# Patient Record
Sex: FEMALE | Race: WHITE | URBAN - METROPOLITAN AREA
[De-identification: names, ages, dates, MRNs, and addresses within clinical notes are randomized per-mention and may not be internally consistent; named-entity substitution may affect disease eponyms.]

---

## 2023-09-15 PROBLEM — F32.A DEPRESSION: Status: ACTIVE | Noted: 2023-09-15

## 2023-09-15 PROBLEM — E03.9 ACQUIRED HYPOTHYROIDISM: Status: ACTIVE | Noted: 2019-09-05

## 2023-09-15 PROBLEM — F43.23 ADJUSTMENT REACTION WITH ANXIETY AND DEPRESSION: Status: ACTIVE | Noted: 2021-03-18

## 2023-09-15 PROBLEM — E55.9 VITAMIN D DEFICIENCY: Status: ACTIVE | Noted: 2020-09-04

## 2023-09-15 PROBLEM — E88.810 INSULIN RESISTANCE SYNDROME: Status: ACTIVE | Noted: 2021-03-18

## 2023-09-15 PROBLEM — E87.5 HYPERPOTASSEMIA: Status: ACTIVE | Noted: 2020-10-16

## 2023-09-15 PROBLEM — R76.0 ABNORMAL ANTINUCLEAR ANTIBODY TITER: Status: ACTIVE | Noted: 2023-09-15

## 2023-09-15 PROBLEM — E11.9 CONTROLLED DIABETES MELLITUS TYPE II WITHOUT COMPLICATION (MULTI): Status: ACTIVE | Noted: 2021-03-19

## 2023-09-15 PROBLEM — M19.90 ARTHRITIS: Status: ACTIVE | Noted: 2023-09-15

## 2023-09-15 PROBLEM — F41.9 ANXIETY: Status: ACTIVE | Noted: 2019-09-05

## 2023-09-15 PROBLEM — N95.9 MENOPAUSAL DISORDER: Status: ACTIVE | Noted: 2020-09-04

## 2023-09-15 PROBLEM — E78.2 MIXED HYPERLIPIDEMIA: Status: ACTIVE | Noted: 2019-09-05

## 2023-09-15 PROBLEM — K21.9 GASTROESOPHAGEAL REFLUX DISEASE: Status: ACTIVE | Noted: 2019-09-05

## 2023-09-15 PROBLEM — E53.8 COBALAMIN DEFICIENCY: Status: ACTIVE | Noted: 2020-09-04

## 2023-09-15 PROBLEM — N95.1 HOT FLASHES DUE TO MENOPAUSE: Status: ACTIVE | Noted: 2022-10-11

## 2023-09-15 RX ORDER — LANOLIN ALCOHOL/MO/W.PET/CERES
1 CREAM (GRAM) TOPICAL DAILY
COMMUNITY
End: 2023-10-24 | Stop reason: WASHOUT

## 2023-09-15 RX ORDER — ROSUVASTATIN CALCIUM 5 MG/1
1 TABLET, COATED ORAL DAILY
COMMUNITY
Start: 2023-05-24 | End: 2023-10-24 | Stop reason: SDUPTHER

## 2023-09-15 RX ORDER — MAGNESIUM GLUCONATE 27.5 (500)
500 TABLET ORAL DAILY
COMMUNITY
Start: 2019-09-05 | End: 2024-01-26 | Stop reason: ALTCHOICE

## 2023-09-15 RX ORDER — FLUTICASONE PROPIONATE 50 MCG
1 SPRAY, SUSPENSION (ML) NASAL DAILY
COMMUNITY

## 2023-09-15 RX ORDER — AZELASTINE 1 MG/ML
1 SPRAY, METERED NASAL 2 TIMES DAILY
COMMUNITY
End: 2023-10-24 | Stop reason: WASHOUT

## 2023-09-15 RX ORDER — ASPIRIN 81 MG/1
1 TABLET ORAL DAILY
COMMUNITY
Start: 2018-08-06 | End: 2024-03-18 | Stop reason: ALTCHOICE

## 2023-09-15 RX ORDER — VENLAFAXINE HYDROCHLORIDE 150 MG/1
150 CAPSULE, EXTENDED RELEASE ORAL DAILY
COMMUNITY
Start: 2023-03-21 | End: 2023-10-24 | Stop reason: SDUPTHER

## 2023-09-15 RX ORDER — IPRATROPIUM BROMIDE 42 UG/1
2 SPRAY, METERED NASAL 2 TIMES DAILY
COMMUNITY
Start: 2020-09-04

## 2023-09-15 RX ORDER — MAGNESIUM OXIDE/MAG AA CHELATE 300 MG
1 CAPSULE ORAL DAILY PRN
COMMUNITY

## 2023-09-15 RX ORDER — FLUTICASONE PROPIONATE 50 MCG
2 SPRAY, SUSPENSION (ML) NASAL DAILY
COMMUNITY
Start: 2018-08-06 | End: 2023-10-24 | Stop reason: WASHOUT

## 2023-09-15 RX ORDER — MELOXICAM 15 MG/1
1 TABLET ORAL DAILY
COMMUNITY
Start: 2020-09-04 | End: 2023-10-24 | Stop reason: SDUPTHER

## 2023-09-15 RX ORDER — HYDROQUINONE 40 MG/G
CREAM TOPICAL
COMMUNITY
Start: 2021-03-18 | End: 2024-03-18 | Stop reason: ALTCHOICE

## 2023-09-15 RX ORDER — VENLAFAXINE HYDROCHLORIDE 75 MG/1
1 CAPSULE, EXTENDED RELEASE ORAL DAILY
COMMUNITY
End: 2023-10-24 | Stop reason: WASHOUT

## 2023-09-15 RX ORDER — TRIAMCINOLONE ACETONIDE 1 MG/G
OINTMENT TOPICAL 3 TIMES DAILY
COMMUNITY
Start: 2018-08-06 | End: 2023-10-24 | Stop reason: WASHOUT

## 2023-09-15 RX ORDER — LEVOTHYROXINE SODIUM 88 UG/1
1 TABLET ORAL DAILY
COMMUNITY
Start: 2018-08-06 | End: 2023-10-24 | Stop reason: SDUPTHER

## 2023-09-15 RX ORDER — CALCIUM CARBONATE 500(1250)
1 TABLET ORAL DAILY
COMMUNITY
Start: 2018-08-06 | End: 2023-10-24 | Stop reason: WASHOUT

## 2023-09-15 RX ORDER — ACETAMINOPHEN 500 MG
1 TABLET ORAL DAILY
COMMUNITY
Start: 2018-08-06

## 2023-09-15 RX ORDER — OMEPRAZOLE 40 MG/1
CAPSULE, DELAYED RELEASE ORAL
COMMUNITY
Start: 2018-08-06 | End: 2023-10-24 | Stop reason: SDUPTHER

## 2023-10-20 ENCOUNTER — APPOINTMENT (OUTPATIENT)
Dept: PRIMARY CARE | Facility: CLINIC | Age: 78
End: 2023-10-20
Payer: MEDICARE

## 2023-10-24 ENCOUNTER — OFFICE VISIT (OUTPATIENT)
Dept: PRIMARY CARE | Facility: CLINIC | Age: 78
End: 2023-10-24
Payer: MEDICARE

## 2023-10-24 VITALS
HEIGHT: 64 IN | SYSTOLIC BLOOD PRESSURE: 124 MMHG | BODY MASS INDEX: 29.1 KG/M2 | OXYGEN SATURATION: 98 % | DIASTOLIC BLOOD PRESSURE: 82 MMHG | HEART RATE: 78 BPM | WEIGHT: 170.47 LBS

## 2023-10-24 DIAGNOSIS — E03.9 ACQUIRED HYPOTHYROIDISM: ICD-10-CM

## 2023-10-24 DIAGNOSIS — M54.50 ACUTE RIGHT-SIDED LOW BACK PAIN WITHOUT SCIATICA: Primary | ICD-10-CM

## 2023-10-24 DIAGNOSIS — Z76.89 ENCOUNTER TO ESTABLISH CARE WITH NEW DOCTOR: ICD-10-CM

## 2023-10-24 DIAGNOSIS — K21.9 GASTROESOPHAGEAL REFLUX DISEASE, UNSPECIFIED WHETHER ESOPHAGITIS PRESENT: ICD-10-CM

## 2023-10-24 DIAGNOSIS — E78.5 HYPERLIPIDEMIA, UNSPECIFIED HYPERLIPIDEMIA TYPE: ICD-10-CM

## 2023-10-24 DIAGNOSIS — N95.1 HOT FLASHES DUE TO MENOPAUSE: ICD-10-CM

## 2023-10-24 PROCEDURE — 99204 OFFICE O/P NEW MOD 45 MIN: CPT | Performed by: STUDENT IN AN ORGANIZED HEALTH CARE EDUCATION/TRAINING PROGRAM

## 2023-10-24 PROCEDURE — 1159F MED LIST DOCD IN RCRD: CPT | Performed by: STUDENT IN AN ORGANIZED HEALTH CARE EDUCATION/TRAINING PROGRAM

## 2023-10-24 PROCEDURE — 99214 OFFICE O/P EST MOD 30 MIN: CPT | Mod: GC | Performed by: STUDENT IN AN ORGANIZED HEALTH CARE EDUCATION/TRAINING PROGRAM

## 2023-10-24 PROCEDURE — 1036F TOBACCO NON-USER: CPT | Performed by: STUDENT IN AN ORGANIZED HEALTH CARE EDUCATION/TRAINING PROGRAM

## 2023-10-24 PROCEDURE — 1125F AMNT PAIN NOTED PAIN PRSNT: CPT | Performed by: STUDENT IN AN ORGANIZED HEALTH CARE EDUCATION/TRAINING PROGRAM

## 2023-10-24 RX ORDER — VENLAFAXINE HYDROCHLORIDE 150 MG/1
150 CAPSULE, EXTENDED RELEASE ORAL DAILY
Qty: 90 CAPSULE | Refills: 1 | Status: SHIPPED | OUTPATIENT
Start: 2023-10-24 | End: 2024-04-15 | Stop reason: SDUPTHER

## 2023-10-24 RX ORDER — MELOXICAM 15 MG/1
15 TABLET ORAL DAILY
Qty: 30 TABLET | Refills: 0 | Status: SHIPPED | OUTPATIENT
Start: 2023-10-24 | End: 2023-11-20

## 2023-10-24 RX ORDER — LEVOTHYROXINE SODIUM 88 UG/1
88 TABLET ORAL DAILY
Qty: 90 TABLET | Refills: 1 | Status: SHIPPED | OUTPATIENT
Start: 2023-10-24 | End: 2024-04-15 | Stop reason: SDUPTHER

## 2023-10-24 RX ORDER — OMEPRAZOLE 40 MG/1
40 CAPSULE, DELAYED RELEASE ORAL
Qty: 90 CAPSULE | Refills: 1 | Status: SHIPPED | OUTPATIENT
Start: 2023-10-24 | End: 2024-04-15 | Stop reason: SDUPTHER

## 2023-10-24 RX ORDER — ROSUVASTATIN CALCIUM 5 MG/1
5 TABLET, COATED ORAL DAILY
Qty: 90 TABLET | Refills: 1 | Status: SHIPPED | OUTPATIENT
Start: 2023-10-24 | End: 2024-04-15 | Stop reason: SDUPTHER

## 2023-10-24 ASSESSMENT — PATIENT HEALTH QUESTIONNAIRE - PHQ9
1. LITTLE INTEREST OR PLEASURE IN DOING THINGS: NOT AT ALL
2. FEELING DOWN, DEPRESSED OR HOPELESS: NOT AT ALL
SUM OF ALL RESPONSES TO PHQ9 QUESTIONS 1 AND 2: 0

## 2023-10-24 ASSESSMENT — COLUMBIA-SUICIDE SEVERITY RATING SCALE - C-SSRS
6. HAVE YOU EVER DONE ANYTHING, STARTED TO DO ANYTHING, OR PREPARED TO DO ANYTHING TO END YOUR LIFE?: NO
2. HAVE YOU ACTUALLY HAD ANY THOUGHTS OF KILLING YOURSELF?: NO
1. IN THE PAST MONTH, HAVE YOU WISHED YOU WERE DEAD OR WISHED YOU COULD GO TO SLEEP AND NOT WAKE UP?: NO

## 2023-10-24 ASSESSMENT — PAIN SCALES - GENERAL: PAINLEVEL: 10-WORST PAIN EVER

## 2023-10-24 NOTE — PROGRESS NOTES
Subjective   Patient ID: PENNY Hhan is a 77 y.o. female who presents for Med Refill (Pt would like a refill on all her meds / nr), Labs Only (Pt would like to get lab work order to check if she is diabetic), and Back Pain (Pt would like to discuss her lower right side pain, for some months ).    HPI  78 yo female here to establish care  Est care  2. Menopausal symptoms  Gets bad hot flashes  On venlafaxine  mg daily  3. Hypothyroidism  On Levothyroxine 188 mcg  4. GERD  On omeprazole 40 mg daily  Mainly has issues at night when sleeping  5. HLD  Rosuvastatin 5 mg  6. R lower back pain  Going on for months  Has it almost every morning  After she starts moving, it improves  No pain down leg  Chiropractor isn't helping  Meloxicam has been helpful in past    Review of Systems  All pertinent positive symptoms are included in the history of present illness.    All other systems have been reviewed and are negative and noncontributory to this patient's current ailments.     Allergies   Allergen Reactions    Acyclovir Unknown    Hydrocodone-Homatropine Itching    Other GI Upset        Immunization History   Administered Date(s) Administered    Flu vaccine, quadrivalent, high-dose, preservative free, age 65y+ (FLUZONE) 10/05/2021, 10/14/2022    Influenza, High Dose Seasonal, Preservative Free 10/30/2017, 10/16/2018, 11/01/2018, 09/13/2019, 09/30/2019    Influenza, Unspecified 10/05/2021    Influenza, seasonal, injectable 09/13/2019, 09/11/2020    Pfizer COVID-19 vaccine, bivalent, age 12 years and older (30 mcg/0.3 mL) 10/14/2022    Pfizer Gray Cap SARS-CoV-2 05/23/2022    Pfizer Purple Cap SARS-CoV-2 03/01/2021, 03/30/2021, 10/05/2021    Pneumococcal conjugate vaccine, 13-valent (PREVNAR 13) 09/06/2015, 09/16/2015    Pneumococcal polysaccharide vaccine, 23-valent, age 2 years and older (PNEUMOVAX 23) 01/04/2012    Pneumococcal, Unspecified 01/04/2012    Tdap vaccine, age 7 year and older (BOOSTRIX) 06/11/2022     "Zoster vaccine, recombinant, adult (SHINGRIX) 12/01/2009, 12/03/2020    Zoster, live 08/07/2020       Objective   Vitals:    10/24/23 1050   BP: 124/82   BP Location: Left arm   Patient Position: Sitting   BP Cuff Size: Adult   Pulse: 78   SpO2: 98%   Weight: 77.3 kg (170 lb 7.5 oz)   Height: 1.626 m (5' 4\")       Physical Exam  CONSTITUTIONAL - well nourished, well developed, looks like stated age, in no acute distress, not ill-appearing, and not tired appearing  SKIN - normal skin color and pigmentation, normal skin turgor without rash, lesions, or nodules visualized  HEAD - no trauma, normocephalic  EYES - extraocular muscles are intact, and normal external exam  ENT - atraumatic  NECK - supple without rigidity, no neck mass was observed  CHEST - clear to auscultation, no wheezing, no crackles and no rales, good effort  CARDIAC - regular rate and regular rhythm, no skipped beats, no murmur  ABDOMEN - no organomegaly, soft, nontender, nondistended, no guarding/rebound/rigidity, negative McBurney sign and negative Ogden sign  EXTREMITIES - no edema, no deformities  MSK - R back: PTP in right lower back, Full ROM, pain with back extension and right side bending. R LE: Full ROM, Full muscle strength  NEUROLOGICAL - normal gait, normal balance, normal motor, no ataxia, alert, oriented and no focal signs  PSYCHIATRIC - alert, pleasant and cordial, age-appropriate  IMMUNOLOGIC - no cervical lymphadenopathy     Assessment/Plan   Problem List Items Addressed This Visit       Hot flashes due to menopause    Gastroesophageal reflux disease    Acquired hypothyroidism     Other Visit Diagnoses       Acute right-sided low back pain without sciatica    -  Primary    Encounter to establish care with new doctor        Hyperlipidemia, unspecified hyperlipidemia type            78 yo female here to establish care  Est care  PMHx reviewed  2. Menopausal symptoms  Continue venlafaxine  mg daily  3. Hypothyroidism  Continue " Levothyroxine 188 mcg  4. GERD  Continue omeprazole 40 mg daily  Mainly has issues at night when sleeping  5. HLD  Continue Rosuvastatin 5 mg  6. R lower back pain  PT referral  Meloxicam prn    RTC in 6 months for HM

## 2023-11-07 ENCOUNTER — APPOINTMENT (OUTPATIENT)
Dept: PRIMARY CARE | Facility: CLINIC | Age: 78
End: 2023-11-07
Payer: MEDICARE

## 2023-11-08 ENCOUNTER — EVALUATION (OUTPATIENT)
Dept: PHYSICAL THERAPY | Facility: CLINIC | Age: 78
End: 2023-11-08
Payer: MEDICARE

## 2023-11-08 DIAGNOSIS — M54.50 ACUTE RIGHT-SIDED LOW BACK PAIN WITHOUT SCIATICA: ICD-10-CM

## 2023-11-08 DIAGNOSIS — M54.50 LUMBAGO: Primary | ICD-10-CM

## 2023-11-08 PROCEDURE — 97162 PT EVAL MOD COMPLEX 30 MIN: CPT | Mod: GP

## 2023-11-08 ASSESSMENT — PAIN - FUNCTIONAL ASSESSMENT: PAIN_FUNCTIONAL_ASSESSMENT: 0-10

## 2023-11-08 NOTE — LETTER
November 8, 2023     Patient: Sagar Hahn   YOB: 1945   Date of Visit: 11/8/2023       To Whom it May Concern:    Sagar Hahn was seen in my clinic on 11/8/2023. She {Return to school/sport:94181}.    If you have any questions or concerns, please don't hesitate to call.         Sincerely,          Juany Soliman, PT        CC: No Recipients

## 2023-11-08 NOTE — LETTER
November 8, 2023     Patient: Sagar Hahn   YOB: 1945   Date of Visit: 11/8/2023       To Whom It May Concern:    It is my medical opinion that Sagar Hahn {Work release (duty restriction):01101}.    If you have any questions or concerns, please don't hesitate to call.         Sincerely,        Juany Soliman, PT    CC: No Recipients

## 2023-11-08 NOTE — PROGRESS NOTES
Physical Therapy Evaluation    Patient Name: PENNY Hahn  MRN: [id number]  Evaluation Date: 11/8/2023  Time Calculation  Start Time: 1100  Stop Time: 1140  Time Calculation (min): 40 min      Subjective   General:  General  Reason for Referral: R LB pain  Past Medical History Relevant to Rehab: Insidous onset of R LBP.  Pt saw chiropractor who did adjustments but no relief.  Pt saw PCP referred pt to PT.  No Xrays were done.    Precautions:   none    Relevant PMH:  Arthritis, Hypothyroidism, acid reflux, R RTC issues per pt    Red flags: none    Pain:  Pain Assessment: 0-10  Pain Score:  (Pain increases to 4/10 in AM but now 0/10)  Pain Type: Chronic pain  Pain Location:  (Pt reports pain is intermittent and achy.  No radicular pain.  On occasion pt has L LE numbness but this has been for years and has not changed with current LBP.)  Effect of Pain on Daily Activities: Pain in LB is worst in morning and eases as she moves or walks around.  Pain only comes on in morning.  Pt is avid exerciser without problems.  Patient's Stated Pain Goal:  (No pain in morning.)  Home Living:  Home Living Comment: Pt lives alone in one floor home with one step to enter.    Prior Function Per Pt/Caregiver Report:  Level of [place]: Independent with ADLs and functional transfers, Independent with homemaking with ambulation  Vocational: Retired  Leisure: line dancer and exercise classes      OBJECTIVE:  Objective   Posture:   Ant head with forward rounded shoulders.  Mild decrease in lumbar lordosis  Range of Motion:   AROM trunk:  flex to 61 deg and ext to 13 deg both without increased pain  B LE AROM is wnl  Strength:   Trunk strength is 3+/5  B Hip strength is 4/5, B knee and ankle DF strength is 4+/5  Flexibility:   Passive SLR to 50 deg on R and 53 deg on L  Palpation:   Tender 1-2 inches proximal to R SI joint  Special Tests:   Negative slump and SLR bilaterally  Gait:   No gross abnormalities    Outcome Measures:  Oswestry      Assessment  PT Assessment Results: Decreased range of motion, Pain  Rehab Prognosis: Good  Evaluation/Treatment Tolerance: Patient tolerated treatment well    Pt is a 77 y.o. female who presents with impairments of R LBP which affects mobility first thing in morning.  Pt has decreased flexibility/ Pt would benefit from skilled physical therapy intervention to improve above impairments and facilitate return to function.    Plan  Treatment/Interventions: Electrical stimulation, Manual therapy, Neuromuscular re-education, Therapeutic activities, Therapeutic exercises, Ultrasound, Taping techniques  PT Plan: Skilled PT  PT Frequency:  (PT to see pt 1-2x/week for up to 8 visits total in 90 days)  Number of Treatments Authorized: awaiting authorization  Rehab Potential: Good  Plan of Care Agreement: Patient    Plan for next visit: Stretching ex for LB for HEP for AM    OP EDUCATION:  Education  Individual(s) Educated: Patient  Education Provided: Anatomy, POC, Posture  Risk and Benefits Discussed with Patient/Caregiver/Other: yes  Patient/Caregiver Demonstrated Understanding: yes  Plan of Care Discussed and Agreed Upon: yes  Patient Response to Education: Patient/Caregiver Verbalized Understanding of Information, Patient/Caregiver Asked Appropriate Questions        Goals:  Active       PT Problem R LBP       PT Goal 1       Start:  11/08/23    Expected End:  02/06/24       Pt will be independent with HEP to allow pt to wake up painfree of LBP in morning         PT Goal 2       Start:  11/08/23    Expected End:  02/06/24       Pt will demonstrate increased flexibility to be painfree with trunk motion in morning         PT Goal 3       Start:  11/08/23    Expected End:  02/06/24       Pt will demonstrate proper body mechanics during PT sessions to help avoid exacerbation of LBP

## 2023-12-05 ENCOUNTER — TREATMENT (OUTPATIENT)
Dept: PHYSICAL THERAPY | Facility: CLINIC | Age: 78
End: 2023-12-05
Payer: MEDICARE

## 2023-12-05 DIAGNOSIS — M54.50 LUMBAGO: ICD-10-CM

## 2023-12-05 DIAGNOSIS — M54.50 ACUTE RIGHT-SIDED LOW BACK PAIN WITHOUT SCIATICA: ICD-10-CM

## 2023-12-05 PROCEDURE — 97110 THERAPEUTIC EXERCISES: CPT | Mod: GP,CQ

## 2023-12-05 PROCEDURE — 97140 MANUAL THERAPY 1/> REGIONS: CPT | Mod: GP,CQ

## 2023-12-05 ASSESSMENT — PAIN - FUNCTIONAL ASSESSMENT: PAIN_FUNCTIONAL_ASSESSMENT: 0-10

## 2023-12-05 ASSESSMENT — PAIN SCALES - GENERAL: PAINLEVEL_OUTOF10: 0 - NO PAIN

## 2023-12-05 NOTE — PROGRESS NOTES
"Physical Therapy Treatment    Patient Name: Sagar Hahn  MRN: [id number]  Encounter date:  12/5/2023  Time Calculation  Start Time: 1045  Stop Time: 1130  Time Calculation (min): 45 min    Visit Number:  1 / 10 + eval   Visit Authorized:  10 visit 11/10/23 to 2/7/24    Current Problem  1. Acute right-sided low back pain without sciatica  Follow Up In Physical Therapy      2. Lumbago  Follow Up In Physical Therapy          Precautions  Precautions  Precautions Comment: None    Pain  Pain Assessment: 0-10  Pain Score: 0 - No pain    Subjective  General  General Comment: Vist 1 of 10 (\"It is just in the morning. I can walk around and it goes away but it is consistant.\")       Objective  Tight ITB RLE in sidelying       Treatment:   Stretching ex for LB for HEP for AM  HEP instructed and handouts issued.  Log roll sit <> supine x 2   Hooklying   LTR x 10   SKTC alt 3 ea hold x 10 ea   Hamsting stretch 10\"x 3   DKTC - one at a time  3 sec hold x 3    Nustep level 1 with UE, 5 min     Standing x 10 BUE support   DL B heel raises with focus on eccentric   B Toe raises  3 way hip R/L     Manual left side lying  Glut STM right, mnaual ITB stretch R  **pt reports right shoulder bothered her during manual** (consider another position next session)       Billed Treatment Times:  Therapeutic Exercise 30 min and Manual Therapy  10 min      Assessment:   Instructed AM stretching program for c/o of right sided pain. Handouts issued. Initiated lumbopelivc ROM and strengthening.   Pt's response to treatment:  Good     Pain end of session:  0    Plan:  OP PT Plan  Treatment/Interventions: Electrical stimulation, Manual therapy, Neuromuscular re-education, Therapeutic activities, Therapeutic exercises, Ultrasound, Taping techniques  PT Plan: Skilled PT  Number of Treatments Authorized: 10 visit approved  Continue with current POC/no changes    Assessment of current progress against goals:  Insufficient treatment time to assess " progress    Goals:  Active       PT Problem R LBP       PT Goal 1       Start:  11/08/23    Expected End:  02/06/24       Pt will be independent with HEP to allow pt to wake up painfree of LBP in morning         PT Goal 2       Start:  11/08/23    Expected End:  02/06/24       Pt will demonstrate increased flexibility to be painfree with trunk motion in morning         PT Goal 3       Start:  11/08/23    Expected End:  02/06/24       Pt will demonstrate proper body mechanics during PT sessions to help avoid exacerbation of LBP

## 2023-12-05 NOTE — PATIENT INSTRUCTIONS
Access Code: [email or web address]  URL: [email or web address]  Date: 12/05/2023  Prepared by: Candy Brown    Exercises  - Supine Lower Trunk Rotation  - 1 x daily - 5 x weekly - 1-2 sets - 10 reps  - Hooklying Single Knee to Chest Stretch  - 1 x daily - 5 x weekly - 1-2 sets - 10 reps  - Supine Hamstring Stretch  - 1 x daily - 5 x weekly - 3 reps - 20-30 sec  hold  - Supine Double Knee to Chest Modified  - 1 x daily - 5 x weekly - 3 reps - 20-30 sec hold  - Supine Hip Adduction Isometric with Ball  - 1 x daily - 5 x weekly - 1-2 sets - 10 reps  - Hooklying Isometric Clamshell  - 1 x daily - 5 x weekly - 1-2 sets - 10 reps

## 2023-12-12 ENCOUNTER — TREATMENT (OUTPATIENT)
Dept: PHYSICAL THERAPY | Facility: CLINIC | Age: 78
End: 2023-12-12
Payer: MEDICARE

## 2023-12-12 DIAGNOSIS — M54.50 ACUTE RIGHT-SIDED LOW BACK PAIN WITHOUT SCIATICA: ICD-10-CM

## 2023-12-12 DIAGNOSIS — M54.50 LUMBAGO: ICD-10-CM

## 2023-12-12 PROCEDURE — 97035 APP MDLTY 1+ULTRASOUND EA 15: CPT | Mod: GP

## 2023-12-12 PROCEDURE — 97110 THERAPEUTIC EXERCISES: CPT | Mod: GP

## 2023-12-12 NOTE — PROGRESS NOTES
"Physical Therapy Treatment    Patient Name: Sagar Hahn  MRN: [id number]  Encounter date:  12/12/2023  Time Calculation  Start Time: 1145  Stop Time: 1232  Time Calculation (min): 47 min    Visit Number:  2 / 8   Visit Authorized:  eval +10    Current Problem  1. Acute right-sided low back pain without sciatica  Follow Up In Physical Therapy      2. Lumbago  Follow Up In Physical Therapy          Precautions   none    Pain   TL pain 7-8/10    Subjective  General   Pt woke up with pain across thoracolumbar area.  Pt has been doing HEP and this seems to help LB.  Pt has pain in R trap as well but this may be related to R shoulder pain.  Pt does have LBP upon waking and feels better with HEP.      Objective  Trunk AROM: Flex to 71 deg, ext to 18 deg with increased pain in L inf rib area, Rot to R is min limited and stretches area and trunk rot to L is min limited with sharper pain in L inf rib area.  SB to L causes sharper pain in post inf L rib area and to R pt feels pull in same area.     Pt denies increased pain with deep breathing or coughing.      Treatment:  THERAPEUTIC EX: (30 min)  Seated LB stretch 10\"x3   Trunk AROM all planes.     LTR small ROM 10x2- pt had pain with rot to R so pt encouraged to perform smaller ROM.  Pain lessened with this motion.   SKTC 15\"x3 R/L  DKTC 15\"x3  Lat release B with assist of PT   B shoulder AAROM flex on wall 10x    Pt able to move trunk easier into ext with less pain.    Manual: STM to L TL psp with in R SL with pillow between knees for 3 min    Ultrasound applied to L TL psp   Parameters:  1 MHz  Percentage:  50%  Patient Position:  R sidelying  Intensity:  1.3 Kramer/cm2  Time:  9 min       Assessment:     Pt's response to treatment:  Pt notes pain relief with exs and US.  Pt had improved trunk ext post session.    Pain end of session:  1/10    Plan:     Continue with current POC/no changes    Assessment of current progress against goals:  Progressing toward functional " goals    Goals:  Active       PT Problem R LBP       PT Goal 1       Start:  11/08/23    Expected End:  02/06/24       Pt will be independent with HEP to allow pt to wake up painfree of LBP in morning         PT Goal 2       Start:  11/08/23    Expected End:  02/06/24       Pt will demonstrate increased flexibility to be painfree with trunk motion in morning         PT Goal 3       Start:  11/08/23    Expected End:  02/06/24       Pt will demonstrate proper body mechanics during PT sessions to help avoid exacerbation of LBP

## 2023-12-19 ENCOUNTER — DOCUMENTATION (OUTPATIENT)
Dept: PHYSICAL THERAPY | Facility: CLINIC | Age: 78
End: 2023-12-19
Payer: MEDICARE

## 2023-12-19 NOTE — PROGRESS NOTES
Physical Therapy                 Therapy Communication Note    Patient Name: Sagar Hahn  MRN: [id number]  Today's Date: 12/19/2023     Discipline: Physical Therapy    Missed Visit Reason:  Pt came to appointment but is feeling great and does not feel she needs any PT today.  Requests cancel appointment today    Missed Time: Cancel    Comment:

## 2023-12-26 ENCOUNTER — APPOINTMENT (OUTPATIENT)
Dept: PHYSICAL THERAPY | Facility: CLINIC | Age: 78
End: 2023-12-26
Payer: MEDICARE

## 2024-01-02 ENCOUNTER — APPOINTMENT (OUTPATIENT)
Dept: PHYSICAL THERAPY | Facility: CLINIC | Age: 79
End: 2024-01-02
Payer: MEDICARE

## 2024-01-09 ENCOUNTER — APPOINTMENT (OUTPATIENT)
Dept: PHYSICAL THERAPY | Facility: CLINIC | Age: 79
End: 2024-01-09
Payer: MEDICARE

## 2024-01-16 ENCOUNTER — APPOINTMENT (OUTPATIENT)
Dept: PHYSICAL THERAPY | Facility: CLINIC | Age: 79
End: 2024-01-16
Payer: MEDICARE

## 2024-01-25 NOTE — PROGRESS NOTES
[facility]  New Visit Note        HISTORY OF PRESENT ILLNESS     The patient's referring provider is: Matthias Thompson PA-C    HISTORY OF PRESENT ILLNESS   Sagar Hahn is a 78 y.o. female who presents to a [facility] for a sleep medicine evaluation with concerns of Consult and Snoring.     PAST SLEEP HISTORY    Patient has the following sleep-related diagnoses and sleep study results: none    CURRENT HISTORY    On today's visit, the patient reports that she snores loudly. This is bothersome when she travels and sleeps in the same room as her friend.  She also was noted to have a small airway per her dentist.  She denies symptoms of witnessed apnea, waking up gasping choking or difficulty staying asleep.  She does take a nap almost every afternoon which she finds refreshing.    She is not aware of any family history and denies any family history of snoring.    STOP  2 ST  BANG 1 A    Sleep schedule  on weekdays / work days:  Usual Bedtime  11:30 p  Falls asleep around  11:30 p  Wake time  7:30 a    Sleep schedule  on weekends/non work days :  same    Naps:   1 hour afternoon    Average sleep duration 9 hours/day    Preferred sleeping position: side    Sleep-related ROS:    Sleep Initiation: no problems going to sleep    Sleep Maintenance: denies problematic awakenings    Breathing during sleep: snoring    RLS screen:  denies    Sleep-related behaviors:  denies    Daytime Symptoms  Patient reports: mild daytime sleepiness, okay with 9 hours sleep + nap    Recreational drug use  Caffeine consumption:  1/day  Alcohol consumption: 2/week  Smoking: quit 2022 ( still smokes sometimes)  Marijuana: past use (1968)    ESS: 4   AMADOU: 6      REVIEW OF SYSTEMS     All other systems negative      ALLERGIES AND MEDICATIONS     ALLERGIES  Allergies   Allergen Reactions    Acyclovir Unknown    Hydrocodone-Homatropine Itching    Other GI Upset        MEDICATIONS  Current Outpatient Medications   Medication Sig Dispense Refill    aspirin 81 mg EC tablet Take 1 tablet (81 mg) by mouth once daily.      cholecalciferol (Vitamin D-3) 5,000 Units tablet Take 1 tablet (5,000 Units) by mouth once daily.      fluticasone (Flonase) 50 mcg/actuation nasal spray Administer 1 spray into each nostril once daily.      hydroquinone 4 % cream APPLY TO PIGMENT 2 TIMES A DAY FOR 3 MONTHS. TAKE A BREAK FOR 1 MONTH AND THEN RESTART.      ipratropium (Atrovent) 42 mcg (0.06 %) nasal spray Administer 2 sprays into each nostril 2 times a day.      levothyroxine (Synthroid, Levoxyl) 88 mcg tablet Take 1 tablet (88 mcg) by mouth once daily. 90 tablet 1    magnesium oxide-Mg AA chelate (Magnesium, oxide/AA chelate,) 300 mg capsule Take 1 capsule (300 mg) by mouth once daily as needed.      meloxicam (Mobic) 15 mg tablet TAKE 1 TABLET BY MOUTH EVERY DAY (Patient taking differently: Take 1 tablet (15 mg) by mouth once daily as needed for moderate pain (4 - 6).) 90 tablet 1    omeprazole (PriLOSEC) 40 mg DR capsule Take 1 capsule (40 mg) by mouth once daily in the morning. Take before meals. 90 capsule 1    rosuvastatin (Crestor) 5 mg tablet Take 1 tablet (5 mg) by mouth once daily. 90 tablet 1    venlafaxine XR (Effexor-XR) 150 mg 24 hr capsule Take 1 capsule (150 mg) by mouth once daily. 90 capsule 1     No current facility-administered medications for this visit.         PAST HISTORY     PAST MEDICAL HISTORY  She  has no past medical history on file.    PAST SURGICAL HISTORY:  Past Surgical History:   Procedure Laterality Date    CATARACT EXTRACTION  08/07/2018    Cataract Surgery    OTHER SURGICAL HISTORY  08/07/2018    Wrist Surgery       FAMILY HISTORY  Family History   Problem Relation Name Age of Onset    Lung cancer Mother      Other (heart issues) Father         SOCIAL HISTORY  She  reports that she has been smoking cigarettes. She started smoking about 59 years ago. She has  "never used smokeless tobacco. She reports current alcohol use of about 2.0 standard drinks of alcohol per week. She reports that she does not currently use drugs.       PHYSICAL EXAM     VITAL SIGNS: /76   Pulse 88   Ht 1.626 m (5' 4\")   Wt 77.1 kg (170 lb)   LMP  (LMP Unknown)   SpO2 97%   BMI 29.18 kg/m²      CURRENT WEIGHT: [unfilled]  BMI: [unfilled]  PREVIOUS WEIGHTS:  Wt Readings from Last 3 Encounters:   01/26/24 77.1 kg (170 lb)   10/24/23 77.3 kg (170 lb 7.5 oz)   05/24/23 77.1 kg (170 lb)       PHYSICAL EXAM: GENERAL: alert oriented x 3 pleasant and cooperative no acute distress  MODIFIED OSORIO SCORE: 3+  MODIFIED MALLAMPATI SCORE: 3+  LATERAL PHARYNGEAL WALL: 2+  UVULA: midline  TONGUE SCALLOPING: normal  NECK EXAM: normal supple no adenopathy    RESULTS/DATA     No results found for: \"IRON\", \"TRANSFERRIN\", \"IRONSAT\", \"TIBC\", \"FERRITIN\"    ASSESSMENT/PLAN     Ms. Hahn is a 78 y.o. female and She was referred to the [facility] for the following issues:    OBSTRUCTIVE SLEEP APNEA, SUSPECTED / SNORING  -Ordering at home sleep test  -Discussed symptoms and risks of untreated sleep apnea    BMI>25  -Body mass index is 29.18 kg/m².  Today    Followup 3 weeks after sleep study to review results                "

## 2024-01-26 ENCOUNTER — OFFICE VISIT (OUTPATIENT)
Dept: SLEEP MEDICINE | Facility: CLINIC | Age: 79
End: 2024-01-26
Payer: MEDICARE

## 2024-01-26 VITALS
HEIGHT: 64 IN | DIASTOLIC BLOOD PRESSURE: 76 MMHG | SYSTOLIC BLOOD PRESSURE: 124 MMHG | OXYGEN SATURATION: 97 % | HEART RATE: 88 BPM | BODY MASS INDEX: 29.02 KG/M2 | WEIGHT: 170 LBS

## 2024-01-26 DIAGNOSIS — G47.30 SLEEP-RELATED BREATHING DISORDER: Primary | ICD-10-CM

## 2024-01-26 DIAGNOSIS — R06.83 SNORING: ICD-10-CM

## 2024-01-26 PROBLEM — E55.9 VITAMIN D DEFICIENCY: Status: RESOLVED | Noted: 2020-09-04 | Resolved: 2024-01-26

## 2024-01-26 PROBLEM — E11.9 CONTROLLED DIABETES MELLITUS TYPE II WITHOUT COMPLICATION (MULTI): Status: RESOLVED | Noted: 2021-03-19 | Resolved: 2024-01-26

## 2024-01-26 PROBLEM — E88.810 INSULIN RESISTANCE SYNDROME: Status: RESOLVED | Noted: 2021-03-18 | Resolved: 2024-01-26

## 2024-01-26 PROCEDURE — 1159F MED LIST DOCD IN RCRD: CPT | Performed by: PHYSICIAN ASSISTANT

## 2024-01-26 PROCEDURE — 1160F RVW MEDS BY RX/DR IN RCRD: CPT | Performed by: PHYSICIAN ASSISTANT

## 2024-01-26 PROCEDURE — 99203 OFFICE O/P NEW LOW 30 MIN: CPT | Performed by: PHYSICIAN ASSISTANT

## 2024-01-26 PROCEDURE — 1125F AMNT PAIN NOTED PAIN PRSNT: CPT | Performed by: PHYSICIAN ASSISTANT

## 2024-01-26 ASSESSMENT — SLEEP AND FATIGUE QUESTIONNAIRES
SLEEP_PROBLEM_NOTICEABLE_TO_OTHERS: A LITTLE
HOW LIKELY ARE YOU TO NOD OFF OR FALL ASLEEP WHILE WATCHING TV: WOULD NEVER DOZE
HOW LIKELY ARE YOU TO NOD OFF OR FALL ASLEEP WHILE SITTING QUIETLY AFTER LUNCH WITHOUT ALCOHOL: WOULD NEVER DOZE
DIFFICULTY_FALLING_ASLEEP: MILD
HOW LIKELY ARE YOU TO NOD OFF OR FALL ASLEEP WHILE LYING DOWN TO REST IN THE AFTERNOON WHEN CIRCUMSTANCES PERMIT: HIGH CHANCE OF DOZING
HOW LIKELY ARE YOU TO NOD OFF OR FALL ASLEEP WHILE SITTING AND TALKING TO SOMEONE: WOULD NEVER DOZE
SATISFACTION_WITH_CURRENT_SLEEP_PATTERN: SATISFIED
ESS-CHAD TOTAL SCORE: 4
HOW LIKELY ARE YOU TO NOD OFF OR FALL ASLEEP IN A CAR, WHILE STOPPED FOR A FEW MINUTES IN TRAFFIC: WOULD NEVER DOZE
SLEEP_PROBLEM_INTERFERES_DAILY_ACTIVITIES: NOT AT ALL NOTICEABLE
WORRIED_DISTRESSED_DUE_TO_SLEEP: MUCH
HOW LIKELY ARE YOU TO NOD OFF OR FALL ASLEEP WHEN YOU ARE A PASSENGER IN A CAR FOR AN HOUR WITHOUT A BREAK: SLIGHT CHANCE OF DOZING
HOW LIKELY ARE YOU TO NOD OFF OR FALL ASLEEP WHILE SITTING AND READING: WOULD NEVER DOZE
SITING INACTIVE IN A PUBLIC PLACE LIKE A CLASS ROOM OR A MOVIE THEATER: WOULD NEVER DOZE

## 2024-01-26 ASSESSMENT — LIFESTYLE VARIABLES
SKIP TO QUESTIONS 9-10: 1
AUDIT-C TOTAL SCORE: 2
HOW MANY STANDARD DRINKS CONTAINING ALCOHOL DO YOU HAVE ON A TYPICAL DAY: 1 OR 2
HOW OFTEN DO YOU HAVE A DRINK CONTAINING ALCOHOL: 2-4 TIMES A MONTH
HOW OFTEN DO YOU HAVE SIX OR MORE DRINKS ON ONE OCCASION: NEVER

## 2024-01-26 ASSESSMENT — PATIENT HEALTH QUESTIONNAIRE - PHQ9
1. LITTLE INTEREST OR PLEASURE IN DOING THINGS: NOT AT ALL
SUM OF ALL RESPONSES TO PHQ9 QUESTIONS 1 & 2: 0
2. FEELING DOWN, DEPRESSED OR HOPELESS: NOT AT ALL

## 2024-01-26 ASSESSMENT — PAIN SCALES - GENERAL: PAINLEVEL: 5

## 2024-01-26 NOTE — PATIENT INSTRUCTIONS
Thank you for coming to the Sleep Medicine Clinic today! Your sleep medicine provider today was: Matthias Thompson PA-C Below is a summary of your treatment plan, other important information, and our contact numbers:      TREATMENT PLAN     Call [phone]), option 3 to schedule your sleep study. When you have an appointment please call us back at [phone] to schedule a followup appointment 3-4 weeks after to review results.    Obstructive Sleep Apnea (OLESYA) is a sleep disorder where your upper airway muscles relax during sleep and the airway intermittently and repetitively narrows and collapses leading to partially blocked airway (hypopnea) or completely blocked airway (apnea) which, in turn, can disrupt breathing in sleep, lower oxygen levels while you sleep and cause night time wakings. Because both apnea and hypopnea may cause higher carbon dioxide or low oxygen levels, untreated OLESYA can lead to heart arrhythmia, elevation of blood pressure, and make it harder for the body to consolidate memory and facilitate metabolism (leading to higher blood sugars at night). Frequent partial arousals occur during sleep resulting in sleep deprivation and daytime sleepiness. OLESYA is associated with an increased risk of cardiovascular disease, stroke, hypertension, and insulin resistance. Moreover, untreated OLESYA with excessive daytime sleepiness can increase the risk of motor vehicular accidents.    Some conservative strategies for OLESYA regardless of OLESYA severity are:   Positional therapy - Avoid sleeping on your back.   Healthy diet and regular exercise to optimize weight is highly encouraged.   Avoid alcohol late in the evening and sedative-hypnotics as these substances can make sleep apnea worse.   Improve breathing through the nose with intranasal steroid spray, saline rinse, or antihistamines    Safety: Avoid driving vehicle and operating heavy equipment while sleepy. Drowsy driving may lead to life-threatening  motor vehicle accidents. A person driving while sleepy is 5 times more likely to have an accident. If you feel sleepy, pull over and take a short power nap (sleep for less than 30 minutes). Otherwise, ask somebody to drive you.    Treatment options for sleep apnea include weight management, positional therapy, Positive Airway Therapy (PAP) therapy, oral appliance therapy, hypoglossal nerve stimulator (Inspire) and select airway surgeries.      OUR SLEEP TESTING LOCATIONS     Our team will contact you to schedule your sleep study, however, you can contact us as follow:  Main Phone Line (scheduling only): [phone]), option 3  Adult and Pediatric Locations  [facility] (6 years and older): [facility] - 4th floor ([address]) After hours line: [phone]  [facility] (Main campus: All ages): [facility], 6th floor. After hours line: [phone]   [place] (18 years and older): [address] [facility], 2nd floor   [place] (18 years and older): [address] [facility]; 4th floor  After hours line: [phone]  [facility] (18 years and older) at [place]: [address] [facility]  After hours line: [phone]    [place] (5 years and older; younger considered on case-by-case basis): [address] [facility]; [address], [address]. Scheduling  After hours line: [phone]   [place] (6 years and older): [address] ; [address]; [address]   [place] (6 years and older): [address] [facility], [address]  After hours line: [phone]   [place] (13 years and older) in [place]: [address], 2nd floor  After hours line: [phone]   [place] (13 year and older): [address]  After hours line: [phone]      IMPORTANT PHONE NUMBERS     Sleep Medicine Clinic Fax: [phone]  Appointments (for Adult Sleep Clinic): [phone] - option 2  Appointments (For Sleep Studies):  [phone] - option 3  Behavioral Sleep Medicine: [phone]    [facility] ([facility]): [phone]  For clinical questions and refilling prescriptions: [phone]  Merline Naidu (For Allan/Donna): P: [phone]  F: [phone]       CONTACTING YOUR SLEEP MEDICINE PROVIDER     Send a message directly to your provider through \"My Chart\", which is the email service through your  Records Account: [email or web address]   Call [phone] and leave a message. One of the administrative assistants will forward the message to your sleep medicine provider through \"My Chart\" and/or email.     Your sleep medicine provider for this visit was: Matthias Thompson PA-C    "

## 2024-02-14 ENCOUNTER — DOCUMENTATION (OUTPATIENT)
Dept: PHYSICAL THERAPY | Facility: CLINIC | Age: 79
End: 2024-02-14
Payer: MEDICARE

## 2024-02-14 NOTE — PROGRESS NOTES
Physical Therapy    Discharge Summary    Name: Sagar Hahn  MRN: [id number]  : 1945  Date: 24    Discharge Summary: PT    Discharge Information: Date of last visit 23    Therapy Summary: Pt spoke with PT on 23 and was feeling great.  Pt did not call with increased complaints.    Rehab Discharge Reason: Achieved all and/or the most significant goals(s)

## 2024-03-18 ENCOUNTER — OFFICE VISIT (OUTPATIENT)
Dept: PRIMARY CARE | Facility: CLINIC | Age: 79
End: 2024-03-18
Payer: MEDICARE

## 2024-03-18 VITALS
HEIGHT: 64 IN | BODY MASS INDEX: 30.39 KG/M2 | SYSTOLIC BLOOD PRESSURE: 136 MMHG | OXYGEN SATURATION: 98 % | HEART RATE: 89 BPM | WEIGHT: 178 LBS | DIASTOLIC BLOOD PRESSURE: 74 MMHG

## 2024-03-18 DIAGNOSIS — J40 BRONCHITIS: Primary | ICD-10-CM

## 2024-03-18 PROCEDURE — 99213 OFFICE O/P EST LOW 20 MIN: CPT

## 2024-03-18 PROCEDURE — 1126F AMNT PAIN NOTED NONE PRSNT: CPT

## 2024-03-18 PROCEDURE — 1159F MED LIST DOCD IN RCRD: CPT

## 2024-03-18 PROCEDURE — 1160F RVW MEDS BY RX/DR IN RCRD: CPT

## 2024-03-18 RX ORDER — METHYLPREDNISOLONE 4 MG/1
TABLET ORAL
Qty: 21 TABLET | Refills: 0 | Status: SHIPPED | OUTPATIENT
Start: 2024-03-18 | End: 2024-03-25

## 2024-03-18 RX ORDER — BENZONATATE 200 MG/1
200 CAPSULE ORAL 3 TIMES DAILY PRN
Qty: 30 CAPSULE | Refills: 0 | Status: SHIPPED | OUTPATIENT
Start: 2024-03-18 | End: 2024-04-15 | Stop reason: WASHOUT

## 2024-03-18 ASSESSMENT — ENCOUNTER SYMPTOMS
SORE THROAT: 0
CHILLS: 0
SHORTNESS OF BREATH: 1
FEVER: 0
WHEEZING: 0
RHINORRHEA: 1
SINUS PAIN: 0
DIARRHEA: 0
HEADACHES: 0
VOMITING: 0
VOICE CHANGE: 0
CHEST TIGHTNESS: 1
COUGH: 1
NAUSEA: 0

## 2024-03-18 ASSESSMENT — PAIN SCALES - GENERAL: PAINLEVEL: 0-NO PAIN

## 2024-03-18 NOTE — PROGRESS NOTES
"Subjective   Patient ID: PENNY Hahn is a 78 y.o. female who presents for uc fu (Pt went to  for sinus infection, finished z-carolyn yesterday, still coughing phlegm /la).    Covid test negative.      follow-up 3/13/2024 for sinus infection and had symptoms for about 4 days before UC visit. Patient was placed on Z-carolyn and finished dose, improved some but not fully better. Patient states laryngitis and sinus pressure improved. However, now has productive cough and \"does not feel right.\" Associated with mild SOB and chest tightness.           Review of Systems   Constitutional:  Negative for chills and fever.   HENT:  Positive for ear pain and rhinorrhea. Negative for congestion, sinus pain, sore throat and voice change.    Respiratory:  Positive for cough, chest tightness and shortness of breath. Negative for wheezing.    Gastrointestinal:  Negative for diarrhea, nausea and vomiting.   Neurological:  Negative for headaches.       Objective   /74   Pulse 89   Ht 1.626 m (5' 4\")   Wt 80.7 kg (178 lb)   LMP  (LMP Unknown)   SpO2 98%   BMI 30.55 kg/m²     Physical Exam  Constitutional:       Appearance: Normal appearance.   HENT:      Right Ear: Tympanic membrane and ear canal normal.      Left Ear: Tympanic membrane and ear canal normal.      Nose: No congestion or rhinorrhea.      Right Turbinates: Not enlarged, swollen or pale.      Left Turbinates: Not enlarged, swollen or pale.      Right Sinus: No maxillary sinus tenderness or frontal sinus tenderness.      Left Sinus: No maxillary sinus tenderness or frontal sinus tenderness.      Mouth/Throat:      Mouth: Mucous membranes are moist. No oral lesions.      Pharynx: Uvula midline. No oropharyngeal exudate or posterior oropharyngeal erythema.   Eyes:      Conjunctiva/sclera: Conjunctivae normal.   Cardiovascular:      Rate and Rhythm: Normal rate and regular rhythm.      Heart sounds: No murmur heard.  Pulmonary:      Effort: Pulmonary effort is " normal.      Breath sounds: Normal breath sounds. No wheezing, rhonchi or rales.   Lymphadenopathy:      Cervical: No cervical adenopathy.   Skin:     General: Skin is warm and dry.   Neurological:      Mental Status: She is alert.   Psychiatric:         Mood and Affect: Mood and affect normal.       Assessment/Plan   Diagnoses and all orders for this visit:  Bronchitis  -     methylPREDNISolone (Medrol Dospak) 4 mg tablets; Take as directed on package.  -     benzonatate (Tessalon) 200 mg capsule; Take 1 capsule (200 mg) by mouth 3 times a day as needed for cough. Do not crush or chew.  -Call by Thursday if not feeling better and will send in 7 days of Augmentin. Otherwise continue supportive care rest, fluids, Mucinex.

## 2024-03-22 ENCOUNTER — TELEPHONE (OUTPATIENT)
Dept: PRIMARY CARE | Facility: CLINIC | Age: 79
End: 2024-03-22
Payer: MEDICARE

## 2024-03-22 DIAGNOSIS — R05.3 PERSISTENT COUGH: Primary | ICD-10-CM

## 2024-03-22 RX ORDER — AMOXICILLIN AND CLAVULANATE POTASSIUM 875; 125 MG/1; MG/1
875 TABLET, FILM COATED ORAL 2 TIMES DAILY
Qty: 14 TABLET | Refills: 0 | Status: SHIPPED | OUTPATIENT
Start: 2024-03-22 | End: 2024-03-29

## 2024-03-22 NOTE — TELEPHONE ENCOUNTER
Patient called and stated she is not feeling any better than when she saw you on Monday.  She still has a cough and congestion.  She would like to know what to do.

## 2024-03-26 ENCOUNTER — CLINICAL SUPPORT (OUTPATIENT)
Dept: SLEEP MEDICINE | Facility: HOSPITAL | Age: 79
End: 2024-03-26
Payer: MEDICARE

## 2024-03-26 DIAGNOSIS — G47.30 SLEEP-RELATED BREATHING DISORDER: ICD-10-CM

## 2024-03-26 DIAGNOSIS — R06.83 SNORING: ICD-10-CM

## 2024-03-26 PROCEDURE — 95806 SLEEP STUDY UNATT&RESP EFFT: CPT | Performed by: INTERNAL MEDICINE

## 2024-04-15 ENCOUNTER — OFFICE VISIT (OUTPATIENT)
Dept: PRIMARY CARE | Facility: CLINIC | Age: 79
End: 2024-04-15
Payer: MEDICARE

## 2024-04-15 VITALS
OXYGEN SATURATION: 96 % | BODY MASS INDEX: 31.24 KG/M2 | HEART RATE: 70 BPM | WEIGHT: 182 LBS | DIASTOLIC BLOOD PRESSURE: 78 MMHG | SYSTOLIC BLOOD PRESSURE: 138 MMHG

## 2024-04-15 DIAGNOSIS — E78.5 HYPERLIPIDEMIA, UNSPECIFIED HYPERLIPIDEMIA TYPE: ICD-10-CM

## 2024-04-15 DIAGNOSIS — E03.9 ACQUIRED HYPOTHYROIDISM: ICD-10-CM

## 2024-04-15 DIAGNOSIS — K21.9 GASTROESOPHAGEAL REFLUX DISEASE, UNSPECIFIED WHETHER ESOPHAGITIS PRESENT: ICD-10-CM

## 2024-04-15 DIAGNOSIS — N95.1 HOT FLASHES DUE TO MENOPAUSE: ICD-10-CM

## 2024-04-15 DIAGNOSIS — R09.82 POST-NASAL DRAINAGE: Primary | ICD-10-CM

## 2024-04-15 DIAGNOSIS — Z13.1 SCREENING FOR DIABETES MELLITUS: ICD-10-CM

## 2024-04-15 PROCEDURE — 99214 OFFICE O/P EST MOD 30 MIN: CPT

## 2024-04-15 PROCEDURE — 1160F RVW MEDS BY RX/DR IN RCRD: CPT

## 2024-04-15 PROCEDURE — 1123F ACP DISCUSS/DSCN MKR DOCD: CPT

## 2024-04-15 PROCEDURE — 1159F MED LIST DOCD IN RCRD: CPT

## 2024-04-15 PROCEDURE — 1126F AMNT PAIN NOTED NONE PRSNT: CPT

## 2024-04-15 RX ORDER — OMEPRAZOLE 40 MG/1
40 CAPSULE, DELAYED RELEASE ORAL
Qty: 90 CAPSULE | Refills: 1 | Status: SHIPPED | OUTPATIENT
Start: 2024-04-15

## 2024-04-15 RX ORDER — LEVOTHYROXINE SODIUM 88 UG/1
88 TABLET ORAL DAILY
Qty: 90 TABLET | Refills: 1 | Status: SHIPPED | OUTPATIENT
Start: 2024-04-15

## 2024-04-15 RX ORDER — VENLAFAXINE HYDROCHLORIDE 150 MG/1
150 CAPSULE, EXTENDED RELEASE ORAL DAILY
Qty: 90 CAPSULE | Refills: 1 | Status: SHIPPED | OUTPATIENT
Start: 2024-04-15

## 2024-04-15 RX ORDER — ROSUVASTATIN CALCIUM 5 MG/1
5 TABLET, COATED ORAL DAILY
Qty: 90 TABLET | Refills: 1 | Status: SHIPPED | OUTPATIENT
Start: 2024-04-15

## 2024-04-15 ASSESSMENT — ENCOUNTER SYMPTOMS
SHORTNESS OF BREATH: 0
CHILLS: 0
SORE THROAT: 0
COUGH: 1
WHEEZING: 0
SINUS PAIN: 0
SINUS PRESSURE: 0
FEVER: 0

## 2024-04-15 ASSESSMENT — PATIENT HEALTH QUESTIONNAIRE - PHQ9
1. LITTLE INTEREST OR PLEASURE IN DOING THINGS: NOT AT ALL
SUM OF ALL RESPONSES TO PHQ9 QUESTIONS 1 AND 2: 0
2. FEELING DOWN, DEPRESSED OR HOPELESS: NOT AT ALL

## 2024-04-15 ASSESSMENT — PAIN SCALES - GENERAL: PAINLEVEL: 0-NO PAIN

## 2024-04-15 NOTE — PROGRESS NOTES
Subjective   Patient ID: PENNY Hahn is a 78 y.o. female who presents for Med Refill (Levothyroxine,omeprazole,rosuvastatin,venalfaxine /la).    Menopausal symptoms: hot flashes controlled on venlafaxine  mg daily. Needs refills.     Hypothyroidism: controlled on Levothyroxine 88 mcg. Feeling euthyroid, TSH due, needs refills.     GERD: controlled on omeprazole 40 mg daily. Mainly has issues at night when sleeping. Needs refills     HLD: stable on Rosuvastatin 5 mg. Tolerating medication with no side effects. Last lipid due, ordered. Needs refills.      Acute concerns: Chronic productive cough. Sinus pressure and laryngitis resolved with antibiotic in Mid-March except still has lingering cough. Tried Mucinex-DM. +PND.           Review of Systems   Constitutional:  Negative for chills and fever.   HENT:  Positive for postnasal drip. Negative for ear pain, sinus pressure, sinus pain and sore throat.    Respiratory:  Positive for cough. Negative for shortness of breath and wheezing.        Objective   /78   Pulse 70   Wt 82.6 kg (182 lb)   LMP  (LMP Unknown)   SpO2 96%   BMI 31.24 kg/m²     Physical Exam  Constitutional:       Appearance: Normal appearance.   HENT:      Right Ear: Tympanic membrane and ear canal normal.      Left Ear: Tympanic membrane and ear canal normal.      Nose: No congestion or rhinorrhea.      Right Turbinates: Not enlarged, swollen or pale.      Left Turbinates: Not enlarged, swollen or pale.      Right Sinus: No maxillary sinus tenderness or frontal sinus tenderness.      Left Sinus: No maxillary sinus tenderness or frontal sinus tenderness.      Mouth/Throat:      Mouth: Mucous membranes are moist. No oral lesions.      Pharynx: Uvula midline. No oropharyngeal exudate or posterior oropharyngeal erythema.   Eyes:      Conjunctiva/sclera: Conjunctivae normal.   Cardiovascular:      Rate and Rhythm: Normal rate and regular rhythm.      Heart sounds: No murmur  heard.  Pulmonary:      Effort: Pulmonary effort is normal.      Breath sounds: Normal breath sounds. No wheezing, rhonchi or rales.   Lymphadenopathy:      Cervical: No cervical adenopathy.   Skin:     General: Skin is warm and dry.   Neurological:      Mental Status: She is alert.   Psychiatric:         Mood and Affect: Mood and affect normal.         Assessment/Plan   Diagnoses and all orders for this visit:  Post-nasal drainage  Acquired hypothyroidism  -     Tsh With Reflex To Free T4 If Abnormal; Future  -     levothyroxine (Synthroid, Levoxyl) 88 mcg tablet; Take 1 tablet (88 mcg) by mouth once daily.  Gastroesophageal reflux disease, unspecified whether esophagitis present  -     omeprazole (PriLOSEC) 40 mg DR capsule; Take 1 capsule (40 mg) by mouth once daily in the morning. Take before meals.  Hyperlipidemia, unspecified hyperlipidemia type  -     Lipid panel; Future  -     rosuvastatin (Crestor) 5 mg tablet; Take 1 tablet (5 mg) by mouth once daily.  Hot flashes due to menopause  -     venlafaxine XR (Effexor-XR) 150 mg 24 hr capsule; Take 1 capsule (150 mg) by mouth once daily.  Screening for diabetes mellitus  -     Comprehensive metabolic panel; Future  -Patient informed to try nightly Zyrtec in addition to her flonase for chronic cough most likely due to drainage. Follow-up in 6 months for Med refill, obtain lab work.

## 2024-04-26 ENCOUNTER — LAB (OUTPATIENT)
Dept: LAB | Facility: LAB | Age: 79
End: 2024-04-26
Payer: MEDICARE

## 2024-04-26 DIAGNOSIS — E03.9 ACQUIRED HYPOTHYROIDISM: ICD-10-CM

## 2024-04-26 DIAGNOSIS — Z13.1 SCREENING FOR DIABETES MELLITUS: ICD-10-CM

## 2024-04-26 DIAGNOSIS — E78.5 HYPERLIPIDEMIA, UNSPECIFIED HYPERLIPIDEMIA TYPE: ICD-10-CM

## 2024-04-26 LAB
ALBUMIN SERPL-MCNC: 4.2 G/DL (ref 3.5–5)
ALP BLD-CCNC: 73 U/L (ref 35–125)
ALT SERPL-CCNC: 33 U/L (ref 5–40)
ANION GAP SERPL CALC-SCNC: 10 MMOL/L
AST SERPL-CCNC: 22 U/L (ref 5–40)
BILIRUB SERPL-MCNC: 0.2 MG/DL (ref 0.1–1.2)
BUN SERPL-MCNC: 26 MG/DL (ref 8–25)
CALCIUM SERPL-MCNC: 8.8 MG/DL (ref 8.5–10.4)
CHLORIDE SERPL-SCNC: 103 MMOL/L (ref 97–107)
CHOLEST SERPL-MCNC: 151 MG/DL (ref 133–200)
CHOLEST/HDLC SERPL: 2.3 {RATIO}
CO2 SERPL-SCNC: 30 MMOL/L (ref 24–31)
CREAT SERPL-MCNC: 0.8 MG/DL (ref 0.4–1.6)
EGFRCR SERPLBLD CKD-EPI 2021: 76 ML/MIN/1.73M*2
GLUCOSE SERPL-MCNC: 93 MG/DL (ref 65–99)
HDLC SERPL-MCNC: 66 MG/DL
LDLC SERPL CALC-MCNC: 68 MG/DL (ref 65–130)
POTASSIUM SERPL-SCNC: 4.7 MMOL/L (ref 3.4–5.1)
PROT SERPL-MCNC: 6.1 G/DL (ref 5.9–7.9)
SODIUM SERPL-SCNC: 143 MMOL/L (ref 133–145)
TRIGL SERPL-MCNC: 86 MG/DL (ref 40–150)
TSH SERPL DL<=0.05 MIU/L-ACNC: 1.27 MIU/L (ref 0.27–4.2)

## 2024-04-26 PROCEDURE — 80053 COMPREHEN METABOLIC PANEL: CPT

## 2024-04-26 PROCEDURE — 36415 COLL VENOUS BLD VENIPUNCTURE: CPT

## 2024-04-26 PROCEDURE — 80061 LIPID PANEL: CPT

## 2024-04-26 PROCEDURE — 84443 ASSAY THYROID STIM HORMONE: CPT

## 2024-04-30 ENCOUNTER — OFFICE VISIT (OUTPATIENT)
Dept: SLEEP MEDICINE | Facility: CLINIC | Age: 79
End: 2024-04-30
Payer: MEDICARE

## 2024-04-30 VITALS
SYSTOLIC BLOOD PRESSURE: 128 MMHG | HEART RATE: 87 BPM | DIASTOLIC BLOOD PRESSURE: 80 MMHG | OXYGEN SATURATION: 97 % | HEIGHT: 64 IN | BODY MASS INDEX: 31.58 KG/M2 | WEIGHT: 185 LBS

## 2024-04-30 DIAGNOSIS — R06.83 SNORING: ICD-10-CM

## 2024-04-30 DIAGNOSIS — G47.33 OBSTRUCTIVE SLEEP APNEA SYNDROME: Primary | ICD-10-CM

## 2024-04-30 PROCEDURE — 1160F RVW MEDS BY RX/DR IN RCRD: CPT | Performed by: PHYSICIAN ASSISTANT

## 2024-04-30 PROCEDURE — 1126F AMNT PAIN NOTED NONE PRSNT: CPT | Performed by: PHYSICIAN ASSISTANT

## 2024-04-30 PROCEDURE — 99213 OFFICE O/P EST LOW 20 MIN: CPT | Performed by: PHYSICIAN ASSISTANT

## 2024-04-30 PROCEDURE — 1159F MED LIST DOCD IN RCRD: CPT | Performed by: PHYSICIAN ASSISTANT

## 2024-04-30 PROCEDURE — 1123F ACP DISCUSS/DSCN MKR DOCD: CPT | Performed by: PHYSICIAN ASSISTANT

## 2024-04-30 ASSESSMENT — SLEEP AND FATIGUE QUESTIONNAIRES
HOW LIKELY ARE YOU TO NOD OFF OR FALL ASLEEP WHILE LYING DOWN TO REST IN THE AFTERNOON WHEN CIRCUMSTANCES PERMIT: HIGH CHANCE OF DOZING
HOW LIKELY ARE YOU TO NOD OFF OR FALL ASLEEP WHILE SITTING AND READING: WOULD NEVER DOZE
HOW LIKELY ARE YOU TO NOD OFF OR FALL ASLEEP WHILE SITTING QUIETLY AFTER LUNCH WITHOUT ALCOHOL: WOULD NEVER DOZE
SITING INACTIVE IN A PUBLIC PLACE LIKE A CLASS ROOM OR A MOVIE THEATER: WOULD NEVER DOZE
HOW LIKELY ARE YOU TO NOD OFF OR FALL ASLEEP IN A CAR, WHILE STOPPED FOR A FEW MINUTES IN TRAFFIC: WOULD NEVER DOZE
HOW LIKELY ARE YOU TO NOD OFF OR FALL ASLEEP WHILE SITTING AND TALKING TO SOMEONE: WOULD NEVER DOZE
HOW LIKELY ARE YOU TO NOD OFF OR FALL ASLEEP WHILE WATCHING TV: SLIGHT CHANCE OF DOZING
ESS-CHAD TOTAL SCORE: 7
HOW LIKELY ARE YOU TO NOD OFF OR FALL ASLEEP WHEN YOU ARE A PASSENGER IN A CAR FOR AN HOUR WITHOUT A BREAK: HIGH CHANCE OF DOZING

## 2024-04-30 ASSESSMENT — PAIN SCALES - GENERAL: PAINLEVEL: 0-NO PAIN

## 2024-04-30 NOTE — PROGRESS NOTES
"[facility] Sleep Medicine Clinic  Followup Visit Note    HISTORY OF PRESENT ILLNESS   Sagar Hahn is a 78 y.o. female who presents to a [facility] for followup.       Current History    Sleep study consistent with severe sleep apnea. She is interested in oral appliance therapy. She snores and feels she lacks energy during the day. She wakes up to use bathroom. No HTN history.    Her  used CPAP in past. She thought it was loud and the mask was very large.    Sleep Scales:  ESS: 7     REVIEW OF SYSTEMS    All other systems negative      PHYSICAL EXAM     VITAL SIGNS: /80   Pulse 87   Ht 1.626 m (5' 4\")   Wt 83.9 kg (185 lb)   LMP  (LMP Unknown)   SpO2 97%   BMI 31.76 kg/m²      CURRENT WEIGHT: [unfilled]  BMI: [unfilled]  PREVIOUS WEIGHTS:  Wt Readings from Last 3 Encounters:   04/30/24 83.9 kg (185 lb)   04/15/24 82.6 kg (182 lb)   03/18/24 80.7 kg (178 lb)       Constitutional: Alert and oriented, cooperative, no obvious distress   HEENT: Non icteric or anemic, EOM WNL bilaterally   Neck: Supple, no JVD, no goiter, no adenopathy, no rigidity  Extremities: No clubbing, no LL edema   Neuromuscular: Cranial nerves grossly intact, no focal deficits     RESULTS/DATA     No results found for: \"IRON\", \"TRANSFERRIN\", \"IRONSAT\", \"TIBC\", \"FERRITIN\"      ASSESSMENT/PLAN     Ms. Hahn is a 78 y.o. female and returns in followup for the following issues:    OBSTRUCTIVE SLEEP APNEA / SLEEPINESS / FREQUENT WAKING  -Reviewed test results with patient  -She already has dentist she wishes to pursue oral appliance therapy with  -Discussed OAT usually the best option in cases of mild OLESYA - recommend she pair it with positional therapy for increased efficacy - info provided for positional therapy devices  -She will discuss OAT with dentist  -She may decide to pursue CPAP therapy - if she chooses this option will order 5-15 cm H2O from [facility] (n30 or n30i mask)    BMI>30  -Body " mass index is 31.76 kg/m².  today  -Wt loss may also help resolve symptoms    Follow up after seeing dentist

## 2024-04-30 NOTE — PATIENT INSTRUCTIONS
Good seeing you today,    I recommend positional therapy for OLESYA. This can treat sleep apnea without the need for PAP or other therapies. Sometimes people find it helpful to sew a tennis ball in a shirt to prevent rolling onto your back.    Here are some products that can assist in keeping you on your side.    [email or web address]    [email or web address]    [email or web address]     Matthias Thompson PA-C    IMPORTANT PHONE NUMBERS     [phone])  [facility] ([employer]): [phone]  For clinical questions and refilling prescriptions: [phone]  Merline Naidu (For Allan/Donna): P: [phone]

## 2024-05-10 ENCOUNTER — TELEPHONE (OUTPATIENT)
Dept: SLEEP MEDICINE | Facility: CLINIC | Age: 79
End: 2024-05-10
Payer: MEDICARE

## 2024-05-10 DIAGNOSIS — G47.33 OBSTRUCTIVE SLEEP APNEA SYNDROME: Primary | ICD-10-CM

## 2024-05-10 NOTE — TELEPHONE ENCOUNTER
Patient is foregoing dental and wants cpap.  She wants resmed #10 airsense autoset  Wants mask with single band going behind head.

## 2024-05-21 ENCOUNTER — TELEPHONE (OUTPATIENT)
Dept: SLEEP MEDICINE | Facility: CLINIC | Age: 79
End: 2024-05-21
Payer: MEDICARE

## 2024-05-21 NOTE — TELEPHONE ENCOUNTER
ANUPAMA Koch MA  Caller: Unspecified (1 week ago)  I sent order          Previous Messages       ----- Message -----  From: Edgard Steinberg  Sent: 5/10/2024   9:34 AM EDT  To: Matthias Thompson PA-C; Nathalia Ayala MA       Patient Call  (Newest Message First)  View All Conversations on this Encounter  Matthias Thompson PA-C  You11 days ago       I sent order     Edgard Steinberg routed conversation to You; RASHID KochC11 days ago     Edgard Steinberg11 days ago     KM  Patient is foregoing dental and wants cpap.  She wants resmed #10 airsense autoset  Wants mask with single band going behind head.         Note        Sagar Hahn [phone]  Edgard Steinberg

## 2024-05-26 DIAGNOSIS — M54.50 ACUTE RIGHT-SIDED LOW BACK PAIN WITHOUT SCIATICA: ICD-10-CM

## 2024-05-28 RX ORDER — MELOXICAM 15 MG/1
15 TABLET ORAL DAILY PRN
Qty: 90 TABLET | Refills: 1 | Status: SHIPPED | OUTPATIENT
Start: 2024-05-28

## 2024-07-18 ENCOUNTER — LAB (OUTPATIENT)
Dept: LAB | Facility: LAB | Age: 79
End: 2024-07-18
Payer: MEDICARE

## 2024-07-18 DIAGNOSIS — E88.810 METABOLIC SYNDROME: Primary | ICD-10-CM

## 2024-07-18 DIAGNOSIS — Z13.29 ENCOUNTER FOR SCREENING FOR OTHER SUSPECTED ENDOCRINE DISORDER: ICD-10-CM

## 2024-07-18 LAB
ALBUMIN SERPL BCP-MCNC: 4.2 G/DL (ref 3.4–5)
ALP SERPL-CCNC: 56 U/L (ref 33–136)
ALT SERPL W P-5'-P-CCNC: 24 U/L (ref 7–45)
AMYLASE SERPL-CCNC: 33 U/L (ref 29–103)
ANION GAP SERPL CALC-SCNC: 12 MMOL/L (ref 10–20)
AST SERPL W P-5'-P-CCNC: 16 U/L (ref 9–39)
BASOPHILS # BLD AUTO: 0.05 X10*3/UL (ref 0–0.1)
BASOPHILS NFR BLD AUTO: 0.8 %
BILIRUB SERPL-MCNC: 0.4 MG/DL (ref 0–1.2)
BUN SERPL-MCNC: 20 MG/DL (ref 6–23)
CALCIUM SERPL-MCNC: 9.2 MG/DL (ref 8.6–10.6)
CHLORIDE SERPL-SCNC: 101 MMOL/L (ref 98–107)
CO2 SERPL-SCNC: 31 MMOL/L (ref 21–32)
CREAT SERPL-MCNC: 0.87 MG/DL (ref 0.5–1.05)
EGFRCR SERPLBLD CKD-EPI 2021: 69 ML/MIN/1.73M*2
EOSINOPHIL # BLD AUTO: 0.22 X10*3/UL (ref 0–0.4)
EOSINOPHIL NFR BLD AUTO: 3.4 %
ERYTHROCYTE [DISTWIDTH] IN BLOOD BY AUTOMATED COUNT: 12.2 % (ref 11.5–14.5)
GLUCOSE SERPL-MCNC: 87 MG/DL (ref 74–99)
HCT VFR BLD AUTO: 39.3 % (ref 36–46)
HGB BLD-MCNC: 12.6 G/DL (ref 12–16)
IMM GRANULOCYTES # BLD AUTO: 0.02 X10*3/UL (ref 0–0.5)
IMM GRANULOCYTES NFR BLD AUTO: 0.3 % (ref 0–0.9)
LIPASE SERPL-CCNC: 35 U/L (ref 9–82)
LYMPHOCYTES # BLD AUTO: 1.69 X10*3/UL (ref 0.8–3)
LYMPHOCYTES NFR BLD AUTO: 25.8 %
MCH RBC QN AUTO: 28.9 PG (ref 26–34)
MCHC RBC AUTO-ENTMCNC: 32.1 G/DL (ref 32–36)
MCV RBC AUTO: 90 FL (ref 80–100)
MONOCYTES # BLD AUTO: 0.58 X10*3/UL (ref 0.05–0.8)
MONOCYTES NFR BLD AUTO: 8.9 %
NEUTROPHILS # BLD AUTO: 3.98 X10*3/UL (ref 1.6–5.5)
NEUTROPHILS NFR BLD AUTO: 60.8 %
NRBC BLD-RTO: 0 /100 WBCS (ref 0–0)
PLATELET # BLD AUTO: 361 X10*3/UL (ref 150–450)
POTASSIUM SERPL-SCNC: 4.8 MMOL/L (ref 3.5–5.3)
PROT SERPL-MCNC: 6.5 G/DL (ref 6.4–8.2)
RBC # BLD AUTO: 4.36 X10*6/UL (ref 4–5.2)
SODIUM SERPL-SCNC: 139 MMOL/L (ref 136–145)
T3 SERPL-MCNC: 99 NG/DL (ref 60–200)
T4 FREE SERPL-MCNC: 1.3 NG/DL (ref 0.78–1.48)
TSH SERPL-ACNC: 1.03 MIU/L (ref 0.44–3.98)
WBC # BLD AUTO: 6.5 X10*3/UL (ref 4.4–11.3)

## 2024-07-18 PROCEDURE — 84439 ASSAY OF FREE THYROXINE: CPT | Mod: WAIVER OF LIABILITY ON FILE

## 2024-07-18 PROCEDURE — 83690 ASSAY OF LIPASE: CPT

## 2024-07-18 PROCEDURE — 85025 COMPLETE CBC W/AUTO DIFF WBC: CPT

## 2024-07-18 PROCEDURE — 36415 COLL VENOUS BLD VENIPUNCTURE: CPT

## 2024-07-18 PROCEDURE — 82150 ASSAY OF AMYLASE: CPT

## 2024-07-18 PROCEDURE — 84443 ASSAY THYROID STIM HORMONE: CPT | Mod: WAIVER OF LIABILITY ON FILE

## 2024-07-18 PROCEDURE — 80053 COMPREHEN METABOLIC PANEL: CPT

## 2024-07-18 PROCEDURE — 84480 ASSAY TRIIODOTHYRONINE (T3): CPT

## 2024-07-24 ENCOUNTER — APPOINTMENT (OUTPATIENT)
Dept: OTOLARYNGOLOGY | Facility: CLINIC | Age: 79
End: 2024-07-24
Payer: MEDICARE

## 2024-08-05 ENCOUNTER — APPOINTMENT (OUTPATIENT)
Dept: SLEEP MEDICINE | Facility: CLINIC | Age: 79
End: 2024-08-05
Payer: MEDICARE

## 2024-08-05 VITALS
OXYGEN SATURATION: 97 % | HEIGHT: 64 IN | BODY MASS INDEX: 29.88 KG/M2 | SYSTOLIC BLOOD PRESSURE: 112 MMHG | DIASTOLIC BLOOD PRESSURE: 70 MMHG | HEART RATE: 92 BPM | WEIGHT: 175 LBS

## 2024-08-05 DIAGNOSIS — G47.33 OBSTRUCTIVE SLEEP APNEA (ADULT) (PEDIATRIC): Primary | ICD-10-CM

## 2024-08-05 PROCEDURE — 1160F RVW MEDS BY RX/DR IN RCRD: CPT | Performed by: INTERNAL MEDICINE

## 2024-08-05 PROCEDURE — G2211 COMPLEX E/M VISIT ADD ON: HCPCS | Performed by: INTERNAL MEDICINE

## 2024-08-05 PROCEDURE — 1159F MED LIST DOCD IN RCRD: CPT | Performed by: INTERNAL MEDICINE

## 2024-08-05 PROCEDURE — 1123F ACP DISCUSS/DSCN MKR DOCD: CPT | Performed by: INTERNAL MEDICINE

## 2024-08-05 PROCEDURE — 99214 OFFICE O/P EST MOD 30 MIN: CPT | Performed by: INTERNAL MEDICINE

## 2024-08-05 PROCEDURE — 1126F AMNT PAIN NOTED NONE PRSNT: CPT | Performed by: INTERNAL MEDICINE

## 2024-08-05 ASSESSMENT — SLEEP AND FATIGUE QUESTIONNAIRES
HOW LIKELY ARE YOU TO NOD OFF OR FALL ASLEEP WHILE SITTING AND READING: WOULD NEVER DOZE
ESS-CHAD TOTAL SCORE: 3
SITING INACTIVE IN A PUBLIC PLACE LIKE A CLASS ROOM OR A MOVIE THEATER: WOULD NEVER DOZE
HOW LIKELY ARE YOU TO NOD OFF OR FALL ASLEEP WHILE SITTING QUIETLY AFTER LUNCH WITHOUT ALCOHOL: WOULD NEVER DOZE
HOW LIKELY ARE YOU TO NOD OFF OR FALL ASLEEP WHILE LYING DOWN TO REST IN THE AFTERNOON WHEN CIRCUMSTANCES PERMIT: WOULD NEVER DOZE
HOW LIKELY ARE YOU TO NOD OFF OR FALL ASLEEP IN A CAR, WHILE STOPPED FOR A FEW MINUTES IN TRAFFIC: WOULD NEVER DOZE
HOW LIKELY ARE YOU TO NOD OFF OR FALL ASLEEP WHEN YOU ARE A PASSENGER IN A CAR FOR AN HOUR WITHOUT A BREAK: HIGH CHANCE OF DOZING
HOW LIKELY ARE YOU TO NOD OFF OR FALL ASLEEP WHILE WATCHING TV: WOULD NEVER DOZE
HOW LIKELY ARE YOU TO NOD OFF OR FALL ASLEEP WHILE SITTING AND TALKING TO SOMEONE: WOULD NEVER DOZE

## 2024-08-05 ASSESSMENT — PAIN SCALES - GENERAL: PAINLEVEL: 0-NO PAIN

## 2024-08-05 NOTE — ASSESSMENT & PLAN NOTE
- Sagar Hahn  has sleep apnea and requires treatment.  - Sagar Hahn demonstrates good compliance and benefit from PAP therapy  - Continue Auto PAP 5-15 cmH20   - Order for renewal of PAP supplies placed through [facility]  -Fitted with a Brevida nasal XS mask, she adapted well to the mask  - We did discuss alternative treatment options including MAD and surgical options including Inspire therapy. She decided that she would like to continue to work with using the CPAP  - Follow-up in 12 months

## 2024-08-05 NOTE — PROGRESS NOTES
"    Subjective   Patient ID: Sagar Hahn is a 78 y.o. female who presents for Sleep Apnea, Pap Adherence Followup, and Inspire - Initial.  HPI    Prior Sleep History:  3/26/2024: HSAT: AHI 3% 66.3, AHI 4% 53.7, JULIANNE 3.4, SpO2 kayli 59.1  Percent Matthias Thompson.  Discussed various treatment options.  Patient initially wanted to consider oral appliance therapy.  Decided to go with CPAP       Current Sleep History:  Sagar is interested in pursuing inspire therapy for treatment.  Patient is currently being managed with CPAP device  She struggles with the CPAP due to traveling frequently. Mask leaks. She likes the pillows but finds them uncomfortable  A downloaded compliance report was reviewed and was interpreted by myself as follows:  > 4 hour compliance was 93 %, with an average use of 7 hours and 10 minutes, with a residual AHI 3.4 on AutoPap 5 to 15 cm H2O.  She is interested in knowing more about Inspire therapy  Wears a P10 mask, but finds it uncomfortable  Likes  minimalist headgear  She does not like to travel with her CPAP  She did not go through with OAT because her dentist thought it would not be effective due to her OLESYA severity  ESS: 3     Review of Systems  Review of systems negative except as per HPI  Objective   /70   Pulse 92   Ht 1.626 m (5' 4\")   Wt 79.4 kg (175 lb)   LMP  (LMP Unknown)   SpO2 97%   BMI 30.04 kg/m²    PREVIOUS WEIGHTS:  Wt Readings from Last 3 Encounters:   08/05/24 79.4 kg (175 lb)   04/30/24 83.9 kg (185 lb)   04/15/24 82.6 kg (182 lb)     Physical Exam  PHYSICAL EXAM: GENERAL: alert pleasant and cooperative no acute distress  nasal mucosa , normal, and pink  MODIFIED OSORIO SCORE: III  MODIFIED MALLAMPATI SCORE: IV (only hard palate visible)  UVULA: midline  TONSILLAR SCORE: 1+  PSYCH EXAM: alert,oriented, in NAD with a full range of affect, normal behavior and no psychotic features    No results found for: \"IRON\", \"TIBC\", \"FERRITIN\"     Assessment/Plan   Problem List " Items Addressed This Visit             ICD-10-CM    Obstructive sleep apnea (adult) (pediatric) - Primary G47.33     - Sagar Hahn  has sleep apnea and requires treatment.  - Sagar Hahn demonstrates good compliance and benefit from PAP therapy  - Continue Auto PAP 5-15 cmH20   - Order for renewal of PAP supplies placed through [facility] [facility]  -Fitted with a Brevida nasal XS mask, she adapted well to the mask  - We did discuss alternative treatment options including MAD and surgical options including Inspire therapy. She decided that she would like to continue to work with using the CPAP  - Follow-up in 12 months          Relevant Orders    Positive Airway Pressure (PAP) Therapy

## 2024-08-31 DIAGNOSIS — M54.50 ACUTE RIGHT-SIDED LOW BACK PAIN WITHOUT SCIATICA: ICD-10-CM

## 2024-09-03 RX ORDER — MELOXICAM 15 MG/1
15 TABLET ORAL DAILY PRN
Qty: 90 TABLET | Refills: 1 | Status: SHIPPED | OUTPATIENT
Start: 2024-09-03

## 2024-09-11 ENCOUNTER — APPOINTMENT (OUTPATIENT)
Dept: OTOLARYNGOLOGY | Facility: CLINIC | Age: 79
End: 2024-09-11
Payer: MEDICARE

## 2024-10-09 DIAGNOSIS — E78.5 HYPERLIPIDEMIA, UNSPECIFIED HYPERLIPIDEMIA TYPE: ICD-10-CM

## 2024-10-09 DIAGNOSIS — E03.9 ACQUIRED HYPOTHYROIDISM: ICD-10-CM

## 2024-10-09 DIAGNOSIS — N95.1 HOT FLASHES DUE TO MENOPAUSE: ICD-10-CM

## 2024-10-09 DIAGNOSIS — K21.9 GASTROESOPHAGEAL REFLUX DISEASE, UNSPECIFIED WHETHER ESOPHAGITIS PRESENT: ICD-10-CM

## 2024-10-09 RX ORDER — VENLAFAXINE HYDROCHLORIDE 150 MG/1
150 CAPSULE, EXTENDED RELEASE ORAL DAILY
Qty: 90 CAPSULE | Refills: 1 | Status: SHIPPED | OUTPATIENT
Start: 2024-10-09

## 2024-10-09 RX ORDER — ROSUVASTATIN CALCIUM 5 MG/1
5 TABLET, COATED ORAL DAILY
Qty: 90 TABLET | Refills: 1 | Status: SHIPPED | OUTPATIENT
Start: 2024-10-09

## 2024-10-09 RX ORDER — OMEPRAZOLE 40 MG/1
40 CAPSULE, DELAYED RELEASE ORAL
Qty: 90 CAPSULE | Refills: 1 | Status: SHIPPED | OUTPATIENT
Start: 2024-10-09

## 2024-10-09 RX ORDER — LEVOTHYROXINE SODIUM 88 UG/1
88 TABLET ORAL DAILY
Qty: 90 TABLET | Refills: 1 | Status: SHIPPED | OUTPATIENT
Start: 2024-10-09

## 2024-10-11 ENCOUNTER — OFFICE VISIT (OUTPATIENT)
Dept: PRIMARY CARE | Facility: CLINIC | Age: 79
End: 2024-10-11
Payer: MEDICARE

## 2024-10-11 VITALS
HEART RATE: 86 BPM | DIASTOLIC BLOOD PRESSURE: 70 MMHG | SYSTOLIC BLOOD PRESSURE: 114 MMHG | OXYGEN SATURATION: 98 % | WEIGHT: 177.8 LBS | HEIGHT: 64 IN | BODY MASS INDEX: 30.35 KG/M2

## 2024-10-11 DIAGNOSIS — G47.33 OBSTRUCTIVE SLEEP APNEA (ADULT) (PEDIATRIC): ICD-10-CM

## 2024-10-11 DIAGNOSIS — N95.1 HOT FLASHES DUE TO MENOPAUSE: ICD-10-CM

## 2024-10-11 DIAGNOSIS — E03.9 ACQUIRED HYPOTHYROIDISM: ICD-10-CM

## 2024-10-11 DIAGNOSIS — K21.9 GASTROESOPHAGEAL REFLUX DISEASE, UNSPECIFIED WHETHER ESOPHAGITIS PRESENT: ICD-10-CM

## 2024-10-11 DIAGNOSIS — H53.9 VISUAL DISTURBANCE: Primary | ICD-10-CM

## 2024-10-11 DIAGNOSIS — E78.5 HYPERLIPIDEMIA, UNSPECIFIED HYPERLIPIDEMIA TYPE: ICD-10-CM

## 2024-10-11 PROCEDURE — 1123F ACP DISCUSS/DSCN MKR DOCD: CPT | Performed by: STUDENT IN AN ORGANIZED HEALTH CARE EDUCATION/TRAINING PROGRAM

## 2024-10-11 PROCEDURE — 99214 OFFICE O/P EST MOD 30 MIN: CPT | Performed by: STUDENT IN AN ORGANIZED HEALTH CARE EDUCATION/TRAINING PROGRAM

## 2024-10-11 PROCEDURE — 1159F MED LIST DOCD IN RCRD: CPT | Performed by: STUDENT IN AN ORGANIZED HEALTH CARE EDUCATION/TRAINING PROGRAM

## 2024-10-11 PROCEDURE — 1126F AMNT PAIN NOTED NONE PRSNT: CPT | Performed by: STUDENT IN AN ORGANIZED HEALTH CARE EDUCATION/TRAINING PROGRAM

## 2024-10-11 ASSESSMENT — PATIENT HEALTH QUESTIONNAIRE - PHQ9
SUM OF ALL RESPONSES TO PHQ9 QUESTIONS 1 AND 2: 0
2. FEELING DOWN, DEPRESSED OR HOPELESS: NOT AT ALL
1. LITTLE INTEREST OR PLEASURE IN DOING THINGS: NOT AT ALL

## 2024-10-11 ASSESSMENT — PAIN SCALES - GENERAL: PAINLEVEL: 0-NO PAIN

## 2024-10-11 NOTE — PROGRESS NOTES
Subjective   Patient ID: Sagar Hahn is a 78 y.o. female who presents for Follow-up (6 month follow up, recently feeling ill while exercising (line dancing)).    HPI  77 yo female here for 6 month follow up  Shakiness/visual changes  When line dancing at particular venue gets shakiness  When she lines dances elsewhere she doesn't get these symptoms  Feels off balance  Fluorescent lights  2. Menopausal symptoms  On venlafaxine  mg daily  3. Hypothyroidism  On Levothyroxine 188 mcg  4. GERD  On omeprazole 40 mg daily  5. HLD  On Rosuvastatin 5 mg  6. Sleep apnea  Started CPAP therapy  Uses mouth piece when she travels  Feels less tired during the day    Review of Systems  All pertinent positive symptoms are included in the history of present illness.    All other systems have been reviewed and are negative and noncontributory to this patient's current ailments.     Allergies   Allergen Reactions    Acyclovir Unknown    Avelox [Moxifloxacin] Nausea/vomiting    Hydrocodone-Homatropine Itching    Other GI Upset        Immunization History   Administered Date(s) Administered    Flu vaccine, quadrivalent, high-dose, preservative free, age 65y+ (FLUZONE) 10/05/2021, 10/14/2022    Flu vaccine, trivalent, preservative free, HIGH-DOSE, age 65y+ (Fluzone) 10/30/2017, 10/16/2018, 11/01/2018, 09/13/2019, 09/30/2019    Influenza, Unspecified 10/05/2021    Influenza, seasonal, injectable 09/13/2019, 09/11/2020    Pfizer COVID-19 vaccine, 12 years and older, (30mcg/0.3mL) (Comirnaty) 10/04/2023, 09/30/2024    Pfizer COVID-19 vaccine, bivalent, age 12 years and older (30 mcg/0.3 mL) 10/14/2022    Pfizer Gray Cap SARS-CoV-2 05/23/2022    Pfizer Purple Cap SARS-CoV-2 03/01/2021, 03/30/2021, 10/05/2021    Pneumococcal conjugate vaccine, 13-valent (PREVNAR 13) 09/06/2015, 09/16/2015    Pneumococcal polysaccharide vaccine, 23-valent, age 2 years and older (PNEUMOVAX 23) 01/04/2012    Pneumococcal, Unspecified 01/04/2012    Tdap  "vaccine, age 7 year and older (BOOSTRIX, ADACEL) 06/11/2022    Zoster vaccine, recombinant, adult (SHINGRIX) 12/01/2009, 12/03/2020    Zoster, live 08/07/2020       Objective   Vitals:    10/11/24 1308   BP: 114/70   Pulse: 86   SpO2: 98%   Weight: 80.6 kg (177 lb 12.8 oz)   Height: 1.626 m (5' 4\")       Physical Exam  CONSTITUTIONAL - well nourished, well developed, looks like stated age, in no acute distress  SKIN - normal skin color and pigmentation. No rash, lesions, or nodules visualized  HEAD - no trauma, normocephalic  EYES - extraocular muscles are intact  ENT - atraumatic  NECK - no neck mass was observed  LUNGS - CTA B/L  CARDIAC - regular rate and regular rhythm  ABDOMEN - no organomegaly, soft, nontender, nondistended  EXTREMITIES - no edema, no deformities  MSK - moves limbs equally  NEUROLOGICAL - alert, oriented and no focal signs  PSYCHIATRIC - alert, pleasant and cordial, age-appropriate  IMMUNOLOGIC - no cervical lymphadenopathy     Assessment/Plan   77 yo female here for 6 month follow up  Shakiness/vision changes  Monitor  Address at next ophtho visit  2. Menopausal symptoms  Continue venlafaxine  mg daily  3. Hypothyroidism  Continue Levothyroxine 188 mcg  4. GERD  Continue omeprazole 40 mg daily  5. HLD  Continue Rosuvastatin 5 mg  6. Sleep apnea  Continue CPAP therapy    RTC in 6 months for medicare wellness  "

## 2024-10-15 ENCOUNTER — APPOINTMENT (OUTPATIENT)
Dept: PRIMARY CARE | Facility: CLINIC | Age: 79
End: 2024-10-15
Payer: MEDICARE

## 2024-11-05 ENCOUNTER — OFFICE VISIT (OUTPATIENT)
Dept: SLEEP MEDICINE | Facility: CLINIC | Age: 79
End: 2024-11-05
Payer: MEDICARE

## 2024-11-05 VITALS
WEIGHT: 175 LBS | SYSTOLIC BLOOD PRESSURE: 108 MMHG | HEIGHT: 64 IN | DIASTOLIC BLOOD PRESSURE: 66 MMHG | HEART RATE: 98 BPM | OXYGEN SATURATION: 93 % | BODY MASS INDEX: 29.88 KG/M2

## 2024-11-05 DIAGNOSIS — G47.33 OBSTRUCTIVE SLEEP APNEA (ADULT) (PEDIATRIC): Primary | ICD-10-CM

## 2024-11-05 PROCEDURE — 1126F AMNT PAIN NOTED NONE PRSNT: CPT | Performed by: PHYSICIAN ASSISTANT

## 2024-11-05 PROCEDURE — 1159F MED LIST DOCD IN RCRD: CPT | Performed by: PHYSICIAN ASSISTANT

## 2024-11-05 PROCEDURE — 1160F RVW MEDS BY RX/DR IN RCRD: CPT | Performed by: PHYSICIAN ASSISTANT

## 2024-11-05 PROCEDURE — 1123F ACP DISCUSS/DSCN MKR DOCD: CPT | Performed by: PHYSICIAN ASSISTANT

## 2024-11-05 PROCEDURE — G2211 COMPLEX E/M VISIT ADD ON: HCPCS | Performed by: PHYSICIAN ASSISTANT

## 2024-11-05 PROCEDURE — 99214 OFFICE O/P EST MOD 30 MIN: CPT | Performed by: PHYSICIAN ASSISTANT

## 2024-11-05 ASSESSMENT — SLEEP AND FATIGUE QUESTIONNAIRES
HOW LIKELY ARE YOU TO NOD OFF OR FALL ASLEEP WHILE SITTING QUIETLY AFTER LUNCH WITHOUT ALCOHOL: WOULD NEVER DOZE
HOW LIKELY ARE YOU TO NOD OFF OR FALL ASLEEP WHILE LYING DOWN TO REST IN THE AFTERNOON WHEN CIRCUMSTANCES PERMIT: HIGH CHANCE OF DOZING
SITING INACTIVE IN A PUBLIC PLACE LIKE A CLASS ROOM OR A MOVIE THEATER: WOULD NEVER DOZE
HOW LIKELY ARE YOU TO NOD OFF OR FALL ASLEEP WHILE WATCHING TV: WOULD NEVER DOZE
HOW LIKELY ARE YOU TO NOD OFF OR FALL ASLEEP WHILE SITTING AND READING: WOULD NEVER DOZE
HOW LIKELY ARE YOU TO NOD OFF OR FALL ASLEEP WHILE SITTING AND TALKING TO SOMEONE: WOULD NEVER DOZE
HOW LIKELY ARE YOU TO NOD OFF OR FALL ASLEEP WHEN YOU ARE A PASSENGER IN A CAR FOR AN HOUR WITHOUT A BREAK: HIGH CHANCE OF DOZING
ESS-CHAD TOTAL SCORE: 6
HOW LIKELY ARE YOU TO NOD OFF OR FALL ASLEEP IN A CAR, WHILE STOPPED FOR A FEW MINUTES IN TRAFFIC: WOULD NEVER DOZE

## 2024-11-05 ASSESSMENT — PAIN SCALES - GENERAL: PAINLEVEL_OUTOF10: 0-NO PAIN

## 2024-11-05 NOTE — PROGRESS NOTES
"[facility]  Followup Visit Note    HISTORY OF PRESENT ILLNESS   Sagar Hahn is a 78 y.o. female who presents to a [facility] for followup.       Current History    On today's visit, the patient reports she does not feel quite as well rested on CPAP but she did not when she first started.  She never noticed significant benefit in terms of daytime sleepiness with oral appliance therapy.    She has had gurgling or crackling in her CPAP which is very bothersome to her.    She also feels that her current mask (Fay Campbell, FX) is still not very comfortable.  She has tried the P10 and reviewed as well.    She does have some interest in doing a titration study with oral appliance if she continues to have difficulty with CPAP.    In general when she is able to use her CPAP she does think it helps more than the oral appliance and helps with daytime sleepiness and her sleep quality. Not snoring on CPAP.    PAP Adherence:      Sleep Scales:  ESS: 6     REVIEW OF SYSTEMS    All other systems negative      PHYSICAL EXAM     VITAL SIGNS: /66   Pulse 98   Ht 1.626 m (5' 4\")   Wt 79.4 kg (175 lb)   LMP  (LMP Unknown)   SpO2 93%   BMI 30.04 kg/m²      PREVIOUS WEIGHTS:  Wt Readings from Last 3 Encounters:   11/05/24 79.4 kg (175 lb)   10/11/24 80.6 kg (177 lb 12.8 oz)   08/05/24 79.4 kg (175 lb)       Constitutional: Alert and oriented, cooperative, no obvious distress   HEENT: Non icteric or anemic, EOM WNL bilaterally   Neck: Supple, no JVD, no goiter, no adenopathy, no rigidity  Extremities: No clubbing, no LL edema   Neuromuscular: Cranial nerves grossly intact, no focal deficits     RESULTS/DATA     No results found for: \"IRON\", \"TRANSFERRIN\", \"IRONSAT\", \"TIBC\", \"FERRITIN\"      ASSESSMENT/PLAN     Ms. Hahn is a 78 y.o. female and returns in followup for the following issues:    OBSTRUCTIVE SLEEP APNEA  -Benefiting from CPAP  -EPR turned to 2 full " time  -tube temp increased to stop popping/rainout  -Fair compliance, OLESYA well controlled per download   -f&p solo sample provided    BMI>30  -Body mass index is 30.04 kg/m².  today  -With sufficient weight loss may no longer require treatment for OLESYA     Follow up 2 months (or 3-4 weeks after sleep study)

## 2024-11-05 NOTE — PATIENT INSTRUCTIONS
Detail Level: Generalized Good seeing you today,    Lets see if new mask (F&P solo) works better. I increased the tube temperature and this should fix the water/gurgling issue. Let me know if it still happens.    We'll plan on seeing you back in a few months if we keep using the CPAP. If we do a sleep study we'll see you back 3-4 weeks after.    Matthias Thompson PA-C    IMPORTANT PHONE NUMBERS     [phone] ([phone])  [employer] ([employer]): [phone]  For clinical questions and refilling prescriptions: [phone]  Bianca Quezada (For Allan[employer]Donna): P: [phone]

## 2024-11-07 ENCOUNTER — APPOINTMENT (OUTPATIENT)
Dept: SLEEP MEDICINE | Facility: CLINIC | Age: 79
End: 2024-11-07
Payer: MEDICARE

## 2025-01-15 ENCOUNTER — OFFICE VISIT (OUTPATIENT)
Dept: SLEEP MEDICINE | Facility: CLINIC | Age: 80
End: 2025-01-15
Payer: COMMERCIAL

## 2025-01-15 VITALS
SYSTOLIC BLOOD PRESSURE: 122 MMHG | BODY MASS INDEX: 29.88 KG/M2 | HEART RATE: 77 BPM | OXYGEN SATURATION: 95 % | DIASTOLIC BLOOD PRESSURE: 74 MMHG | WEIGHT: 175 LBS | HEIGHT: 64 IN

## 2025-01-15 DIAGNOSIS — G47.33 OBSTRUCTIVE SLEEP APNEA (ADULT) (PEDIATRIC): Primary | ICD-10-CM

## 2025-01-15 PROCEDURE — 99214 OFFICE O/P EST MOD 30 MIN: CPT | Performed by: PHYSICIAN ASSISTANT

## 2025-01-15 PROCEDURE — 1126F AMNT PAIN NOTED NONE PRSNT: CPT | Performed by: PHYSICIAN ASSISTANT

## 2025-01-15 PROCEDURE — 1160F RVW MEDS BY RX/DR IN RCRD: CPT | Performed by: PHYSICIAN ASSISTANT

## 2025-01-15 PROCEDURE — 1123F ACP DISCUSS/DSCN MKR DOCD: CPT | Performed by: PHYSICIAN ASSISTANT

## 2025-01-15 PROCEDURE — 1159F MED LIST DOCD IN RCRD: CPT | Performed by: PHYSICIAN ASSISTANT

## 2025-01-15 PROCEDURE — G2211 COMPLEX E/M VISIT ADD ON: HCPCS | Performed by: PHYSICIAN ASSISTANT

## 2025-01-15 ASSESSMENT — SLEEP AND FATIGUE QUESTIONNAIRES
ESS-CHAD TOTAL SCORE: 6
HOW LIKELY ARE YOU TO NOD OFF OR FALL ASLEEP WHILE SITTING QUIETLY AFTER LUNCH WITHOUT ALCOHOL: WOULD NEVER DOZE
HOW LIKELY ARE YOU TO NOD OFF OR FALL ASLEEP WHILE SITTING AND READING: WOULD NEVER DOZE
SITING INACTIVE IN A PUBLIC PLACE LIKE A CLASS ROOM OR A MOVIE THEATER: WOULD NEVER DOZE
HOW LIKELY ARE YOU TO NOD OFF OR FALL ASLEEP WHILE SITTING AND TALKING TO SOMEONE: WOULD NEVER DOZE
HOW LIKELY ARE YOU TO NOD OFF OR FALL ASLEEP WHEN YOU ARE A PASSENGER IN A CAR FOR AN HOUR WITHOUT A BREAK: HIGH CHANCE OF DOZING
HOW LIKELY ARE YOU TO NOD OFF OR FALL ASLEEP WHILE WATCHING TV: WOULD NEVER DOZE
HOW LIKELY ARE YOU TO NOD OFF OR FALL ASLEEP WHILE LYING DOWN TO REST IN THE AFTERNOON WHEN CIRCUMSTANCES PERMIT: HIGH CHANCE OF DOZING
HOW LIKELY ARE YOU TO NOD OFF OR FALL ASLEEP IN A CAR, WHILE STOPPED FOR A FEW MINUTES IN TRAFFIC: WOULD NEVER DOZE

## 2025-01-15 ASSESSMENT — PAIN SCALES - GENERAL: PAINLEVEL_OUTOF10: 0-NO PAIN

## 2025-01-15 NOTE — PROGRESS NOTES
"[facility]  Followup Visit Note    HISTORY OF PRESENT ILLNESS   Sagar Hahn is a 79 y.o. female who presents to a [facility] for followup.       Current History    On today's visit, the patient reports she stopped using her CPAP machine due to it crackling.  She has tried to resolve this without success.    P10, F&P solo mask are okay.    Pressure feels comfortable.    Has CPAP on night stand.    She is back to oral appliance but does not feel she sleeps as well with this.    PAP Adherence:      Sleep Scales:  ESS: 6     REVIEW OF SYSTEMS    All other systems negative      PHYSICAL EXAM     VITAL SIGNS: LMP  (LMP Unknown)      PREVIOUS WEIGHTS:  Wt Readings from Last 3 Encounters:   11/05/24 79.4 kg (175 lb)   10/11/24 80.6 kg (177 lb 12.8 oz)   08/05/24 79.4 kg (175 lb)       Constitutional: Alert and oriented, cooperative, no obvious distress   HEENT: Non icteric or anemic, EOM WNL bilaterally   Neck: Supple, no JVD, no goiter, no adenopathy, no rigidity  Extremities: No clubbing, no LL edema   Neuromuscular: Cranial nerves grossly intact, no focal deficits     RESULTS/DATA     No results found for: \"IRON\", \"TRANSFERRIN\", \"IRONSAT\", \"TIBC\", \"FERRITIN\"      ASSESSMENT/PLAN     Ms. Hahn is a 79 y.o. female and returns in followup for the following issues:    OBSTRUCTIVE SLEEP APNEA  -Benefiting from CPAP when she can use - less sleepiness  -tube temp increased to max of 86 f  -can turn humidity setting down if needed  -consider tubing insulation as well if needed  -Tolerating mask, pressure, humidity well    BMI>30  -Body mass index is 30.04 kg/m².  today  -With sufficient weight loss may no longer require treatment for OLESYA    Follow up 1 yr or sooner         "

## 2025-01-15 NOTE — PATIENT INSTRUCTIONS
Good seeing you today,    Lets see if increased tube temp fixes the gurgling/rainout issue.    We can consider further changes if needed.    Matthias Thompson PA-C    IMPORTANT PHONE NUMBERS     [phone] ([id number])  [employer] ([employer]): [phone]  For clinical questions and refilling prescriptions: [phone]  Bianca Quezada (For [facility]): P: [phone]

## 2025-01-28 ENCOUNTER — APPOINTMENT (OUTPATIENT)
Dept: SLEEP MEDICINE | Facility: CLINIC | Age: 80
End: 2025-01-28
Payer: MEDICARE

## 2025-02-06 ENCOUNTER — TELEPHONE (OUTPATIENT)
Dept: SLEEP MEDICINE | Facility: CLINIC | Age: 80
End: 2025-02-06
Payer: COMMERCIAL

## 2025-02-06 DIAGNOSIS — G47.33 OSA (OBSTRUCTIVE SLEEP APNEA): Primary | ICD-10-CM

## 2025-02-06 NOTE — TELEPHONE ENCOUNTER
Patient has [employer] insurance and her order needs to be sent to [facility] for her supplies.   Fax order to [phone]

## 2025-02-18 ENCOUNTER — APPOINTMENT (OUTPATIENT)
Dept: AUDIOLOGY | Facility: CLINIC | Age: 80
End: 2025-02-18
Payer: COMMERCIAL

## 2025-02-18 DIAGNOSIS — H90.3 ASYMMETRICAL SENSORINEURAL HEARING LOSS: Primary | ICD-10-CM

## 2025-02-18 PROCEDURE — 92557 COMPREHENSIVE HEARING TEST: CPT | Performed by: AUDIOLOGIST

## 2025-02-18 PROCEDURE — 92550 TYMPANOMETRY & REFLEX THRESH: CPT | Performed by: AUDIOLOGIST

## 2025-02-20 NOTE — PROGRESS NOTES
AUDIOLOGY ADULT AUDIOMETRIC EVALUATION    Name:  Sagar Hahn  :  1945  Age:  79 y.o.  Date of Evaluation:  2025    Reason for visit: Ms. Hahn is seen in the clinic today for an audiologic evaluation.     HISTORY  The patient reported noticing a decrease in hearing in her right ear over time.  She is having difficulty hearing soft spoken people and hearing people when in a crowd.  She denied otalgia, aural pressure, tinnitus and dizziness.    EVALUATION  See scanned audiogram: “Media” > “Audiology Report”.      RESULTS  Otoscopic Evaluation:  Right Ear: clear ear canal  Left Ear: clear ear canal    Immittance Measures:  Tympanometry:  Right Ear: Type A, normal tympanic membrane mobility with normal middle ear pressure   Left Ear: Type A, normal tympanic membrane mobility with normal middle ear pressure     Acoustic Reflexes:  Ipsilateral Right Ear: Could not evaluate since an adequate seal could not be maintained    Ipsilateral Left Ear: Present at normal levels at 500-4000 Hz   Contralateral Right Ear: did not evaluate  Contralateral Left Ear: did not evaluate    Distortion Product Otoacoustic Emissions (DPOAEs):  Right Ear: present at 1500, 2000, 4000, and 5000 Hz; absent at 1000, 3000, 6000, and 8000 Hz  Left Ear: present at 6891-5714 Hz; absent at 6243-0852 Hz    Audiometry:  Test Technique and Reliability:   Standard audiometry via supra-aural headphones. Reliability is good.    Pure tone air and bone conduction audiometry:  Right Ear: normal hearing through 2000 Hz, with a mild sloping to moderately-severe sensorineural hearing loss at 1536-6020 Hz  Left Ear: normal hearing through 4000 Hz, with a moderately-severe sensorineural hearing loss at 1770-2526 Hz    Speech Audiometry (Word Recognition Scores):   Right Ear: Excellent, 100% in quiet at an elevated presentation level   Left Ear: Excellent, 100% in quiet at an average conversational level     IMPRESSIONS  Results of today's  audiometric evaluation revealed an asymmetrical high frequency sensorineural hearing loss (right ear worse than left ear).  No prior audiologic evaluation is available for comparison.  Results of tympanometry testing indicated normal middle ear function in both ears.   The presence of acoustic reflexes within normal intensity limits is consistent with normal middle ear and brainstem function, and suggests that auditory sensitivity is not significantly impaired. An elevated or absent acoustic reflex threshold is consistent with a middle ear disorder, hearing loss in the stimulated ear, and/or interruption of neural innervation of the stapedius muscle. Present DPOAEs suggest normal/near normal cochlear outer hair cell function and are consistent with no greater than a mild hearing loss at those frequencies. Absent DPOAEs are consistent with abnormal cochlear outer hair cell function at those frequencies.    RECOMMENDATIONS  - Follow up with otolaryngology regarding asymmetrical hearing loss.  - Annual audiologic evaluation, sooner if an acute change is noted.  - Hearing aid evaluation for right ear if desired.    PATIENT EDUCATION  Discussed results, impressions and recommendations with the patient.  Questions were addressed and the patient was encouraged to contact our office should concerns arise.    Time for this encounter: 3:30-4:00    Gini Rucker M.A., CCC-A   Licensed Audiologist

## 2025-03-20 ENCOUNTER — HOSPITAL ENCOUNTER (OUTPATIENT)
Dept: RADIOLOGY | Facility: HOSPITAL | Age: 80
Discharge: HOME | End: 2025-03-20
Payer: COMMERCIAL

## 2025-03-20 DIAGNOSIS — S83.271A COMPLEX TEAR OF LATERAL MENISCUS, CURRENT INJURY, RIGHT KNEE, INITIAL ENCOUNTER: ICD-10-CM

## 2025-03-20 PROCEDURE — 73721 MRI JNT OF LWR EXTRE W/O DYE: CPT | Mod: RT

## 2025-04-04 DIAGNOSIS — K21.9 GASTROESOPHAGEAL REFLUX DISEASE, UNSPECIFIED WHETHER ESOPHAGITIS PRESENT: ICD-10-CM

## 2025-04-04 DIAGNOSIS — N95.1 HOT FLASHES DUE TO MENOPAUSE: ICD-10-CM

## 2025-04-04 DIAGNOSIS — E78.5 HYPERLIPIDEMIA, UNSPECIFIED HYPERLIPIDEMIA TYPE: ICD-10-CM

## 2025-04-04 DIAGNOSIS — E03.9 ACQUIRED HYPOTHYROIDISM: ICD-10-CM

## 2025-04-04 RX ORDER — OMEPRAZOLE 40 MG/1
40 CAPSULE, DELAYED RELEASE ORAL
Qty: 90 CAPSULE | Refills: 1 | Status: SHIPPED | OUTPATIENT
Start: 2025-04-04

## 2025-04-04 RX ORDER — LEVOTHYROXINE SODIUM 88 UG/1
88 TABLET ORAL DAILY
Qty: 90 TABLET | Refills: 1 | Status: SHIPPED | OUTPATIENT
Start: 2025-04-04

## 2025-04-04 RX ORDER — VENLAFAXINE HYDROCHLORIDE 150 MG/1
150 CAPSULE, EXTENDED RELEASE ORAL DAILY
Qty: 90 CAPSULE | Refills: 1 | Status: SHIPPED | OUTPATIENT
Start: 2025-04-04

## 2025-04-04 RX ORDER — ROSUVASTATIN CALCIUM 5 MG/1
5 TABLET, COATED ORAL DAILY
Qty: 90 TABLET | Refills: 1 | Status: SHIPPED | OUTPATIENT
Start: 2025-04-04

## 2025-04-08 ENCOUNTER — OFFICE VISIT (OUTPATIENT)
Dept: PRIMARY CARE | Facility: CLINIC | Age: 80
End: 2025-04-08
Payer: COMMERCIAL

## 2025-04-08 VITALS
WEIGHT: 187 LBS | HEIGHT: 64 IN | DIASTOLIC BLOOD PRESSURE: 82 MMHG | HEART RATE: 68 BPM | OXYGEN SATURATION: 95 % | BODY MASS INDEX: 31.92 KG/M2 | SYSTOLIC BLOOD PRESSURE: 122 MMHG

## 2025-04-08 DIAGNOSIS — E78.5 HYPERLIPIDEMIA, UNSPECIFIED HYPERLIPIDEMIA TYPE: ICD-10-CM

## 2025-04-08 DIAGNOSIS — G47.33 OBSTRUCTIVE SLEEP APNEA (ADULT) (PEDIATRIC): ICD-10-CM

## 2025-04-08 DIAGNOSIS — N95.1 HOT FLASHES DUE TO MENOPAUSE: ICD-10-CM

## 2025-04-08 DIAGNOSIS — Z12.31 BREAST CANCER SCREENING BY MAMMOGRAM: Primary | ICD-10-CM

## 2025-04-08 DIAGNOSIS — K21.9 GASTROESOPHAGEAL REFLUX DISEASE, UNSPECIFIED WHETHER ESOPHAGITIS PRESENT: ICD-10-CM

## 2025-04-08 DIAGNOSIS — E03.9 ACQUIRED HYPOTHYROIDISM: ICD-10-CM

## 2025-04-08 PROCEDURE — 1036F TOBACCO NON-USER: CPT | Performed by: STUDENT IN AN ORGANIZED HEALTH CARE EDUCATION/TRAINING PROGRAM

## 2025-04-08 PROCEDURE — 1125F AMNT PAIN NOTED PAIN PRSNT: CPT | Performed by: STUDENT IN AN ORGANIZED HEALTH CARE EDUCATION/TRAINING PROGRAM

## 2025-04-08 PROCEDURE — G0439 PPPS, SUBSEQ VISIT: HCPCS | Performed by: STUDENT IN AN ORGANIZED HEALTH CARE EDUCATION/TRAINING PROGRAM

## 2025-04-08 PROCEDURE — 99213 OFFICE O/P EST LOW 20 MIN: CPT | Performed by: STUDENT IN AN ORGANIZED HEALTH CARE EDUCATION/TRAINING PROGRAM

## 2025-04-08 PROCEDURE — 1159F MED LIST DOCD IN RCRD: CPT | Performed by: STUDENT IN AN ORGANIZED HEALTH CARE EDUCATION/TRAINING PROGRAM

## 2025-04-08 PROCEDURE — 99215 OFFICE O/P EST HI 40 MIN: CPT | Mod: 25 | Performed by: STUDENT IN AN ORGANIZED HEALTH CARE EDUCATION/TRAINING PROGRAM

## 2025-04-08 PROCEDURE — 1123F ACP DISCUSS/DSCN MKR DOCD: CPT | Performed by: STUDENT IN AN ORGANIZED HEALTH CARE EDUCATION/TRAINING PROGRAM

## 2025-04-08 RX ORDER — AZELASTINE 1 MG/ML
1 SPRAY, METERED NASAL 2 TIMES DAILY
COMMUNITY

## 2025-04-08 RX ORDER — TIRZEPATIDE 2.5 MG/.5ML
2.5 INJECTION, SOLUTION SUBCUTANEOUS
Qty: 2 ML | Refills: 0 | Status: SHIPPED | OUTPATIENT
Start: 2025-04-08

## 2025-04-08 ASSESSMENT — PAIN SCALES - GENERAL: PAINLEVEL_OUTOF10: 2

## 2025-04-08 ASSESSMENT — PATIENT HEALTH QUESTIONNAIRE - PHQ9
2. FEELING DOWN, DEPRESSED OR HOPELESS: NOT AT ALL
1. LITTLE INTEREST OR PLEASURE IN DOING THINGS: NOT AT ALL
SUM OF ALL RESPONSES TO PHQ9 QUESTIONS 1 AND 2: 0

## 2025-04-08 ASSESSMENT — COGNITIVE AND FUNCTIONAL STATUS - GENERAL: VERBAL FLUENCY - ANIMAL NAMES (0 TO 25): 3

## 2025-04-08 NOTE — PROGRESS NOTES
Subjective   Patient ID: Sagar Hahn is a 79 y.o. female who presents for Annual Exam (Medicare annual wellness).    HPI  78 yo female here for MW  1. MW  Mammogram due  Declines Colonoscopy  Immunizations UTD  Well balanced diet, admits to eating a lot of carbs  Tries to exercise but hard right now due to knee injury  Sees dentist regularly  No hearing or vision changes  No tobacco use  Moderate alcohol use  2. Menopausal symptoms  On venlafaxine  mg daily  3. Hypothyroidism  On Levothyroxine 188 mcg  4. GERD  On omeprazole 40 mg daily  5. HLD  On Rosuvastatin 5 mg  6. Sleep apnea  Uses CPAP therapy  7. R torn meniscus  Seeing ortho  Having surgery 4/15/25  8. BMI 32.1, sleep apnea  Would like to start zepbound    Review of Systems  All pertinent positive symptoms are included in the history of present illness.    All other systems have been reviewed and are negative and noncontributory to this patient's current ailments.     Allergies   Allergen Reactions    Acyclovir Unknown    Avelox [Moxifloxacin] Nausea/vomiting    Hydrocodone-Homatropine Itching    Other GI Upset        Immunization History   Administered Date(s) Administered    Flu vaccine, quadrivalent, high-dose, preservative free, age 65y+ (FLUZONE) 10/05/2021, 10/14/2022    Flu vaccine, trivalent, preservative free, HIGH-DOSE, age 65y+ (Fluzone) 10/30/2017, 10/16/2018, 11/01/2018, 09/13/2019, 09/30/2019    Influenza, Unspecified 10/05/2021    Influenza, seasonal, injectable 09/13/2019, 09/11/2020    Pfizer COVID-19 vaccine, 12 years and older, (30mcg/0.3mL) (Comirnaty) 10/04/2023, 09/30/2024    Pfizer COVID-19 vaccine, bivalent, age 12 years and older (30 mcg/0.3 mL) 10/14/2022    Pfizer Gray Cap SARS-CoV-2 05/23/2022    Pfizer Purple Cap SARS-CoV-2 03/01/2021, 03/30/2021, 10/05/2021    Pneumococcal conjugate vaccine, 13-valent (PREVNAR 13) 09/06/2015, 09/16/2015    Pneumococcal polysaccharide vaccine, 23-valent, age 2 years and older (PNEUMOVAX  "23) 01/04/2012    Pneumococcal, Unspecified 01/04/2012    Tdap vaccine, age 7 year and older (BOOSTRIX, ADACEL) 06/11/2022    Zoster vaccine, recombinant, adult (SHINGRIX) 12/01/2009, 12/03/2020    Zoster, live 08/07/2020       Objective   Vitals:    04/08/25 1000   BP: 122/82   Pulse: 68   SpO2: 95%   Weight: 84.8 kg (187 lb)   Height: 1.626 m (5' 4\")       Physical Exam  CONSTITUTIONAL - well nourished, well developed, looks like stated age, in no acute distress  SKIN - normal skin color and pigmentation. No rash, lesions, or nodules visualized  HEAD - no trauma, normocephalic  EYES - extraocular muscles are intact  ENT - atraumatic  NECK - no neck mass was observed  LUNGS - CTA B/L  CARDIAC - regular rate and regular rhythm  ABDOMEN - no organomegaly, soft, nontender, nondistended  EXTREMITIES - no edema, no deformities  MSK - moves limbs equally  NEUROLOGICAL - alert, oriented and no focal signs  PSYCHIATRIC - alert, pleasant and cordial, age-appropriate  IMMUNOLOGIC - no cervical lymphadenopathy     Assessment/Plan   78 yo female here for MW  1. MW  Mammogram ordered  Colonoscopy declined  Immunizations UTD  Labs ordered  2. Menopausal symptoms  Continue venlafaxine  mg daily  3. Hypothyroidism  Continue Levothyroxine 188 mcg  4. GERD  Continue omeprazole 40 mg daily  5. HLD  Continue Rosuvastatin 5 mg  6. Sleep apnea  Continue CPAP therapy  Start zepbound  7. R torn meniscus  Continue following ortho  8. BMI 32.1, sleep apnea  Start zepbound    RTC in 6 months  "

## 2025-04-12 LAB
ALBUMIN SERPL-MCNC: 4.3 G/DL (ref 3.6–5.1)
ALP SERPL-CCNC: 77 U/L (ref 37–153)
ALT SERPL-CCNC: 27 U/L (ref 6–29)
ANION GAP SERPL CALCULATED.4IONS-SCNC: 8 MMOL/L (CALC) (ref 7–17)
AST SERPL-CCNC: 18 U/L (ref 10–35)
BILIRUB SERPL-MCNC: 0.4 MG/DL (ref 0.2–1.2)
BUN SERPL-MCNC: 25 MG/DL (ref 7–25)
CALCIUM SERPL-MCNC: 9.1 MG/DL (ref 8.6–10.4)
CHLORIDE SERPL-SCNC: 103 MMOL/L (ref 98–110)
CHOLEST SERPL-MCNC: 171 MG/DL
CHOLEST/HDLC SERPL: 2.7 (CALC)
CO2 SERPL-SCNC: 31 MMOL/L (ref 20–32)
CREAT SERPL-MCNC: 0.87 MG/DL (ref 0.6–1)
EGFRCR SERPLBLD CKD-EPI 2021: 68 ML/MIN/1.73M2
GLUCOSE SERPL-MCNC: 84 MG/DL (ref 65–139)
HDLC SERPL-MCNC: 64 MG/DL
LDLC SERPL CALC-MCNC: 79 MG/DL (CALC)
NONHDLC SERPL-MCNC: 107 MG/DL (CALC)
POTASSIUM SERPL-SCNC: 4.5 MMOL/L (ref 3.5–5.3)
PROT SERPL-MCNC: 6.7 G/DL (ref 6.1–8.1)
SODIUM SERPL-SCNC: 142 MMOL/L (ref 135–146)
TRIGL SERPL-MCNC: 183 MG/DL
TSH SERPL-ACNC: 1.67 MIU/L (ref 0.4–4.5)

## 2025-04-14 ENCOUNTER — TELEPHONE (OUTPATIENT)
Dept: PRIMARY CARE | Facility: CLINIC | Age: 80
End: 2025-04-14
Payer: COMMERCIAL

## 2025-04-21 ENCOUNTER — HOSPITAL ENCOUNTER (OUTPATIENT)
Dept: RADIOLOGY | Facility: HOSPITAL | Age: 80
Discharge: HOME | End: 2025-04-21
Payer: COMMERCIAL

## 2025-04-21 DIAGNOSIS — Z12.31 BREAST CANCER SCREENING BY MAMMOGRAM: ICD-10-CM

## 2025-04-21 PROCEDURE — 77067 SCR MAMMO BI INCL CAD: CPT | Performed by: RADIOLOGY

## 2025-04-21 PROCEDURE — 77063 BREAST TOMOSYNTHESIS BI: CPT | Performed by: RADIOLOGY

## 2025-04-21 PROCEDURE — 77067 SCR MAMMO BI INCL CAD: CPT

## 2025-04-24 ENCOUNTER — TELEPHONE (OUTPATIENT)
Dept: PRIMARY CARE | Facility: CLINIC | Age: 80
End: 2025-04-24
Payer: COMMERCIAL

## 2025-04-29 ENCOUNTER — APPOINTMENT (OUTPATIENT)
Dept: OTOLARYNGOLOGY | Facility: CLINIC | Age: 80
End: 2025-04-29
Payer: COMMERCIAL

## 2025-05-07 ENCOUNTER — OFFICE VISIT (OUTPATIENT)
Dept: URGENT CARE | Age: 80
End: 2025-05-07
Payer: COMMERCIAL

## 2025-05-07 ENCOUNTER — APPOINTMENT (OUTPATIENT)
Dept: URGENT CARE | Age: 80
End: 2025-05-07
Payer: COMMERCIAL

## 2025-05-07 VITALS
SYSTOLIC BLOOD PRESSURE: 137 MMHG | WEIGHT: 180 LBS | TEMPERATURE: 97.8 F | OXYGEN SATURATION: 98 % | BODY MASS INDEX: 30.73 KG/M2 | HEART RATE: 74 BPM | DIASTOLIC BLOOD PRESSURE: 85 MMHG | HEIGHT: 64 IN

## 2025-05-07 DIAGNOSIS — J02.9 SORE THROAT: ICD-10-CM

## 2025-05-07 DIAGNOSIS — H66.001 NON-RECURRENT ACUTE SUPPURATIVE OTITIS MEDIA OF RIGHT EAR WITHOUT SPONTANEOUS RUPTURE OF TYMPANIC MEMBRANE: Primary | ICD-10-CM

## 2025-05-07 DIAGNOSIS — J01.91 ACUTE RECURRENT SINUSITIS, UNSPECIFIED LOCATION: ICD-10-CM

## 2025-05-07 LAB
POC HUMAN RHINOVIRUS PCR: NEGATIVE
POC INFLUENZA A VIRUS PCR: NEGATIVE
POC INFLUENZA B VIRUS PCR: NEGATIVE
POC RESPIRATORY SYNCYTIAL VIRUS PCR: NEGATIVE
POC STREPTOCOCCUS PYOGENES (GROUP A STREP) PCR: NEGATIVE

## 2025-05-07 RX ORDER — AMOXICILLIN AND CLAVULANATE POTASSIUM 875; 125 MG/1; MG/1
875 TABLET, FILM COATED ORAL 2 TIMES DAILY
Qty: 20 TABLET | Refills: 0 | Status: SHIPPED | OUTPATIENT
Start: 2025-05-07 | End: 2025-05-17

## 2025-05-07 RX ORDER — IBUPROFEN 200 MG
TABLET ORAL AS NEEDED
COMMUNITY

## 2025-05-07 ASSESSMENT — ENCOUNTER SYMPTOMS
SINUS PRESSURE: 1
FATIGUE: 1
RHINORRHEA: 1
SORE THROAT: 1

## 2025-05-07 NOTE — PROGRESS NOTES
"Subjective   Patient ID: Sagar Hahn is a 79 y.o. female. They present today with a chief complaint of Illness (Sore Throat - Entered by patient) and Sore Throat (Sore throat with ear pain since yesterday).    History of Present Illness  Patient is a 79-year-old female presenting today with complaints of a nasal congestion with clear discharge, right ear pain, and sore throat.  She reports recurrent sinus infection in the past and has been following up with ENT for that.    Past Medical History  Allergies as of 05/07/2025 - Reviewed 05/07/2025   Allergen Reaction Noted    Acyclovir Unknown 09/15/2023    Avelox [moxifloxacin] Nausea/vomiting 10/11/2024    Hydrocodone-homatropine Itching 09/15/2023    Other GI Upset 09/15/2023       Prescriptions Prior to Admission[1]     Medical History[2]    Surgical History[3]     reports that she quit smoking about 6 years ago. Her smoking use included cigarettes. She started smoking about 58 years ago. She has a 26.1 pack-year smoking history. She has never used smokeless tobacco. She reports current alcohol use of about 3.0 standard drinks of alcohol per week. She reports that she does not use drugs.    Review of Systems  Review of Systems   Constitutional:  Positive for fatigue.   HENT:  Positive for congestion, postnasal drip, rhinorrhea, sinus pressure and sore throat.    All other systems reviewed and are negative.                                 Objective    Vitals:    05/07/25 1120   BP: 137/85   Pulse: 74   Temp: 36.6 °C (97.8 °F)   SpO2: 98%   Weight: 81.6 kg (180 lb)   Height: 1.626 m (5' 4\")     No LMP recorded (lmp unknown). Patient is postmenopausal.    Physical Exam  Vitals reviewed.   General: Vitals Noted. No distress. Normocephalic.  Cardiovascular:     Heart sounds: Normal heart sounds, S1 normal and S2 normal. No murmur heard.     No friction rub.   Pulmonary:      Effort: Pulmonary effort is normal.      Breath sounds:  Lungs clear to auscultation " bilaterally   HEENT: Left TM is within normal limits. right TM appears markedly erythematous and bulging with exudative air-fluid levels.  Positive preauricular tenderness.  No drainage.  EOMI, normal conjunctiva, patent nares, Normal OP No maxillary and frontal sinus tenderness on palpation is present. Pharynx and tonsils are hyperemic, and without exudate.   Neck: Supple with no adenopathy.  Lymphadenopathy:   No cervical adenopathy on palpation  Lower Body: No right inguinal adenopathy. No left inguinal adenopathy.   Abdominal:      Palpations: There is no hepatomegaly, splenomegaly or mass. Abdomen is soft, non-tender, and non-distended. No suprapubic tenderness. No CVA tenderness.   Skin:     Comments: no rash   Neurological:      Cranial Nerves: Cranial nerves 2-12 are intact.      Sensory: No sensory deficit.      Motor: Motor function is intact.      Deep Tendon Reflexes: Reflexes are normal and symmetric.       Procedures    Point of Care Test & Imaging Results from this visit  Results for orders placed or performed in visit on 05/07/25   POCT SPOTFIRE R/ST Panel Mini w/Strep A ([facility]) manually resulted   Result Value Ref Range    POC Group A Strep, PCR Negative Negative    POC Respiratory Syncytial Virus PCR Negative Negative    POC Influenza A Virus PCR Negative Negative    POC Influenza B Virus PCR Negative Negative    POC Human Rhinovirus PCR Negative Negative      Imaging  No results found.    Cardiology, Vascular, and Other Imaging  No other imaging results found for the past 2 days      Diagnostic study results (if any) were reviewed by ALLYSON Hicks.    Assessment/Plan   Allergies, medications, history, and pertinent labs/EKGs/Imaging reviewed by ALLYSON Hicks.     Medical Decision Making  Patient is alert and oriented x 3 in no acute distress.  Presents with complaints of a sore throat, nasal congestion and discharge, and ear pain.  Spot fire strep in office is negative.   On presentation examination right TM is erythematous and bulging suggestive of acute otitis media.  Nasal discharge is clear is likely due to viral presentation.  Lungs are clear on auscultation bilaterally.  Patient will be treated for acute otitis media to the right ear today with Augmentin twice daily for 10 days.  Advised on over-the-counter medication to help with sinus congestion and sore throat.  Patient advised on signs and symptoms significant for reevaluation or presentation to ER.  Patient agreed with the plan and verbalized understanding.    Orders and Diagnoses  Diagnoses and all orders for this visit:  Non-recurrent acute suppurative otitis media of right ear without spontaneous rupture of tympanic membrane  -     amoxicillin-clavulanate (Augmentin) 875-125 mg tablet; Take 1 tablet (875 mg) by mouth 2 times a day for 10 days.  Sore throat  -     POCT SPOTFIRE R/ST Panel Mini w/Strep A ([facility]) manually resulted  Acute recurrent sinusitis, unspecified location      Medical Admin Record      Patient disposition: Home    Electronically signed by ALLYSON Hicks  12:26 PM           [1] (Not in a hospital admission)   [2]   Past Medical History:  Diagnosis Date    Arthritis 1999    Disease of thyroid gland     Hypercholesteremia     Low back strain ?    Neck strain ?    Rotator cuff syndrome 2021    Snoring ? Long time   [3]   Past Surgical History:  Procedure Laterality Date    CATARACT EXTRACTION  08/07/2018    Cataract Surgery    OTHER SURGICAL HISTORY  08/07/2018    Wrist Surgery    WRIST SURGERY  2015